# Patient Record
Sex: MALE | Race: WHITE | NOT HISPANIC OR LATINO
[De-identification: names, ages, dates, MRNs, and addresses within clinical notes are randomized per-mention and may not be internally consistent; named-entity substitution may affect disease eponyms.]

---

## 2017-06-23 PROBLEM — Z00.00 ENCOUNTER FOR PREVENTIVE HEALTH EXAMINATION: Status: ACTIVE | Noted: 2017-06-23

## 2017-06-27 ENCOUNTER — APPOINTMENT (OUTPATIENT)
Dept: DERMATOLOGY | Facility: CLINIC | Age: 82
End: 2017-06-27

## 2017-07-11 ENCOUNTER — APPOINTMENT (OUTPATIENT)
Dept: SURGICAL ONCOLOGY | Facility: CLINIC | Age: 82
End: 2017-07-11

## 2017-07-11 ENCOUNTER — RESULT REVIEW (OUTPATIENT)
Age: 82
End: 2017-07-11

## 2017-07-11 ENCOUNTER — LABORATORY RESULT (OUTPATIENT)
Age: 82
End: 2017-07-11

## 2017-07-11 DIAGNOSIS — Z86.79 PERSONAL HISTORY OF OTHER DISEASES OF THE CIRCULATORY SYSTEM: ICD-10-CM

## 2017-07-11 DIAGNOSIS — Z80.3 FAMILY HISTORY OF MALIGNANT NEOPLASM OF BREAST: ICD-10-CM

## 2017-07-11 DIAGNOSIS — Z78.9 OTHER SPECIFIED HEALTH STATUS: ICD-10-CM

## 2017-07-11 DIAGNOSIS — Z80.0 FAMILY HISTORY OF MALIGNANT NEOPLASM OF DIGESTIVE ORGANS: ICD-10-CM

## 2017-07-11 DIAGNOSIS — F17.210 NICOTINE DEPENDENCE, CIGARETTES, UNCOMPLICATED: ICD-10-CM

## 2017-07-11 DIAGNOSIS — N40.0 BENIGN PROSTATIC HYPERPLASIA WITHOUT LOWER URINARY TRACT SYMPMS: ICD-10-CM

## 2017-07-11 RX ORDER — AMLODIPINE BESYLATE AND VALSARTAN 10; 320 MG/1; MG/1
TABLET, FILM COATED ORAL
Refills: 0 | Status: ACTIVE | COMMUNITY

## 2017-07-11 RX ORDER — ATORVASTATIN CALCIUM 80 MG/1
TABLET, FILM COATED ORAL
Refills: 0 | Status: ACTIVE | COMMUNITY

## 2017-07-11 RX ORDER — HYDROCHLOROTHIAZIDE 12.5 MG/1
TABLET ORAL
Refills: 0 | Status: ACTIVE | COMMUNITY

## 2017-07-11 RX ORDER — RIVAROXABAN 2.5 MG/1
TABLET, FILM COATED ORAL
Refills: 0 | Status: ACTIVE | COMMUNITY

## 2017-07-11 RX ORDER — METOPROLOL TARTRATE 75 MG/1
TABLET, FILM COATED ORAL
Refills: 0 | Status: ACTIVE | COMMUNITY

## 2017-07-11 RX ORDER — MULTIVITAMIN
TABLET ORAL
Refills: 0 | Status: ACTIVE | COMMUNITY

## 2017-07-11 RX ORDER — DIGOXIN 0.06 MG/1
TABLET ORAL
Refills: 0 | Status: ACTIVE | COMMUNITY

## 2017-07-11 RX ORDER — FLUTICASONE FUROATE AND VILANTEROL TRIFENATATE 200; 25 UG/1; UG/1
200-25 POWDER RESPIRATORY (INHALATION)
Refills: 0 | Status: ACTIVE | COMMUNITY

## 2017-07-13 ENCOUNTER — APPOINTMENT (OUTPATIENT)
Dept: SURGICAL ONCOLOGY | Facility: CLINIC | Age: 82
End: 2017-07-13

## 2017-07-20 ENCOUNTER — APPOINTMENT (OUTPATIENT)
Dept: SURGICAL ONCOLOGY | Facility: CLINIC | Age: 82
End: 2017-07-20

## 2017-07-20 VITALS
HEIGHT: 72 IN | DIASTOLIC BLOOD PRESSURE: 83 MMHG | RESPIRATION RATE: 12 BRPM | SYSTOLIC BLOOD PRESSURE: 119 MMHG | HEART RATE: 79 BPM | OXYGEN SATURATION: 98 % | BODY MASS INDEX: 29.8 KG/M2 | WEIGHT: 220 LBS | TEMPERATURE: 98.3 F

## 2017-07-20 DIAGNOSIS — C44.42 SQUAMOUS CELL CARCINOMA OF SKIN OF SCALP AND NECK: ICD-10-CM

## 2017-07-21 PROBLEM — C44.42 SQUAMOUS CELL CARCINOMA OF NECK: Status: ACTIVE | Noted: 2017-07-11

## 2017-07-21 RX ORDER — OFLOXACIN 3 MG/ML
0.3 SOLUTION/ DROPS OPHTHALMIC
Qty: 5 | Refills: 0 | Status: ACTIVE | COMMUNITY
Start: 2017-06-23

## 2017-07-21 RX ORDER — AMOXICILLIN AND CLAVULANATE POTASSIUM 875; 125 MG/1; MG/1
875-125 TABLET, COATED ORAL
Qty: 14 | Refills: 0 | Status: ACTIVE | COMMUNITY
Start: 2017-07-16

## 2017-07-21 RX ORDER — ATORVASTATIN CALCIUM 20 MG/1
20 TABLET, FILM COATED ORAL
Qty: 90 | Refills: 0 | Status: ACTIVE | COMMUNITY
Start: 2017-05-27

## 2017-07-21 RX ORDER — METOPROLOL SUCCINATE 50 MG/1
50 TABLET, EXTENDED RELEASE ORAL
Qty: 180 | Refills: 0 | Status: ACTIVE | COMMUNITY
Start: 2017-07-07

## 2017-07-21 RX ORDER — AZITHROMYCIN 250 MG/1
250 TABLET, FILM COATED ORAL
Qty: 6 | Refills: 0 | Status: ACTIVE | COMMUNITY
Start: 2017-06-23

## 2017-07-21 RX ORDER — AMLODIPINE AND VALSARTAN 5; 320 MG/1; MG/1
5-320 TABLET, FILM COATED ORAL
Qty: 90 | Refills: 0 | Status: ACTIVE | COMMUNITY
Start: 2017-07-07

## 2017-07-21 RX ORDER — FLUTICASONE FUROATE AND VILANTEROL TRIFENATATE 100; 25 UG/1; UG/1
100-25 POWDER RESPIRATORY (INHALATION)
Qty: 60 | Refills: 0 | Status: ACTIVE | COMMUNITY
Start: 2017-02-22

## 2017-07-21 RX ORDER — TAMSULOSIN HYDROCHLORIDE 0.4 MG/1
0.4 CAPSULE ORAL
Qty: 180 | Refills: 0 | Status: ACTIVE | COMMUNITY
Start: 2017-06-20

## 2017-07-27 ENCOUNTER — APPOINTMENT (OUTPATIENT)
Dept: SURGICAL ONCOLOGY | Facility: CLINIC | Age: 82
End: 2017-07-27
Payer: MEDICARE

## 2017-07-27 VITALS
BODY MASS INDEX: 29.8 KG/M2 | SYSTOLIC BLOOD PRESSURE: 113 MMHG | RESPIRATION RATE: 13 BRPM | TEMPERATURE: 97.7 F | OXYGEN SATURATION: 98 % | HEART RATE: 77 BPM | DIASTOLIC BLOOD PRESSURE: 77 MMHG | WEIGHT: 220 LBS | HEIGHT: 72 IN

## 2017-07-27 DIAGNOSIS — L08.9 OTHER INJURY OF UNSPECIFIED BODY REGION: ICD-10-CM

## 2017-07-27 DIAGNOSIS — T14.8 OTHER INJURY OF UNSPECIFIED BODY REGION: ICD-10-CM

## 2017-07-27 PROCEDURE — 99024 POSTOP FOLLOW-UP VISIT: CPT

## 2017-08-28 ENCOUNTER — APPOINTMENT (OUTPATIENT)
Dept: DERMATOLOGY | Facility: CLINIC | Age: 82
End: 2017-08-28
Payer: MEDICARE

## 2017-08-28 PROCEDURE — ZZZZZ: CPT

## 2018-01-04 ENCOUNTER — INPATIENT (INPATIENT)
Facility: HOSPITAL | Age: 83
LOS: 6 days | Discharge: NURSING, REHAB OR LONG-TERM CARE | DRG: 698 | End: 2018-01-11
Attending: HOSPITALIST | Admitting: INTERNAL MEDICINE
Payer: MEDICARE

## 2018-01-04 VITALS
HEART RATE: 84 BPM | HEIGHT: 72 IN | WEIGHT: 205.91 LBS | RESPIRATION RATE: 18 BRPM | OXYGEN SATURATION: 96 % | SYSTOLIC BLOOD PRESSURE: 147 MMHG | DIASTOLIC BLOOD PRESSURE: 91 MMHG | TEMPERATURE: 98 F

## 2018-01-04 DIAGNOSIS — R33.8 OTHER RETENTION OF URINE: ICD-10-CM

## 2018-01-04 LAB
ALBUMIN SERPL ELPH-MCNC: 2.5 G/DL — LOW (ref 3.3–5.2)
ALBUMIN SERPL ELPH-MCNC: 3.2 G/DL — LOW (ref 3.3–5.2)
ALBUMIN SERPL ELPH-MCNC: 3.6 G/DL — SIGNIFICANT CHANGE UP (ref 3.3–5.2)
ALP SERPL-CCNC: 62 U/L — SIGNIFICANT CHANGE UP (ref 40–120)
ALP SERPL-CCNC: 78 U/L — SIGNIFICANT CHANGE UP (ref 40–120)
ALP SERPL-CCNC: 93 U/L — SIGNIFICANT CHANGE UP (ref 40–120)
ALT FLD-CCNC: 12 U/L — SIGNIFICANT CHANGE UP
ALT FLD-CCNC: 16 U/L — SIGNIFICANT CHANGE UP
ALT FLD-CCNC: 9 U/L — SIGNIFICANT CHANGE UP
ANION GAP SERPL CALC-SCNC: 14 MMOL/L — SIGNIFICANT CHANGE UP (ref 5–17)
ANION GAP SERPL CALC-SCNC: 17 MMOL/L — SIGNIFICANT CHANGE UP (ref 5–17)
ANION GAP SERPL CALC-SCNC: 21 MMOL/L — HIGH (ref 5–17)
AST SERPL-CCNC: 32 U/L — SIGNIFICANT CHANGE UP
AST SERPL-CCNC: 37 U/L — SIGNIFICANT CHANGE UP
AST SERPL-CCNC: 52 U/L — HIGH
BASE EXCESS BLDA CALC-SCNC: -7.3 MMOL/L — LOW (ref -2–2)
BASOPHILS # BLD AUTO: 0 K/UL — SIGNIFICANT CHANGE UP (ref 0–0.2)
BASOPHILS NFR BLD AUTO: 0.1 % — SIGNIFICANT CHANGE UP (ref 0–2)
BILIRUB SERPL-MCNC: 0.7 MG/DL — SIGNIFICANT CHANGE UP (ref 0.4–2)
BILIRUB SERPL-MCNC: 0.9 MG/DL — SIGNIFICANT CHANGE UP (ref 0.4–2)
BILIRUB SERPL-MCNC: 1.1 MG/DL — SIGNIFICANT CHANGE UP (ref 0.4–2)
BLOOD GAS COMMENTS ARTERIAL: SIGNIFICANT CHANGE UP
BUN SERPL-MCNC: 35 MG/DL — HIGH (ref 8–20)
BUN SERPL-MCNC: 37 MG/DL — HIGH (ref 8–20)
BUN SERPL-MCNC: 37 MG/DL — HIGH (ref 8–20)
CALCIUM SERPL-MCNC: 7.3 MG/DL — LOW (ref 8.6–10.2)
CALCIUM SERPL-MCNC: 8.6 MG/DL — SIGNIFICANT CHANGE UP (ref 8.6–10.2)
CALCIUM SERPL-MCNC: 9.8 MG/DL — SIGNIFICANT CHANGE UP (ref 8.6–10.2)
CHLORIDE SERPL-SCNC: 108 MMOL/L — HIGH (ref 98–107)
CHLORIDE SERPL-SCNC: 95 MMOL/L — LOW (ref 98–107)
CHLORIDE SERPL-SCNC: 99 MMOL/L — SIGNIFICANT CHANGE UP (ref 98–107)
CO2 SERPL-SCNC: 14 MMOL/L — LOW (ref 22–29)
CO2 SERPL-SCNC: 17 MMOL/L — LOW (ref 22–29)
CO2 SERPL-SCNC: 23 MMOL/L — SIGNIFICANT CHANGE UP (ref 22–29)
CREAT SERPL-MCNC: 0.94 MG/DL — SIGNIFICANT CHANGE UP (ref 0.5–1.3)
CREAT SERPL-MCNC: 1.67 MG/DL — HIGH (ref 0.5–1.3)
CREAT SERPL-MCNC: 1.8 MG/DL — HIGH (ref 0.5–1.3)
EOSINOPHIL # BLD AUTO: 0 K/UL — SIGNIFICANT CHANGE UP (ref 0–0.5)
EOSINOPHIL NFR BLD AUTO: 0.1 % — SIGNIFICANT CHANGE UP (ref 0–6)
GAS PNL BLDA: SIGNIFICANT CHANGE UP
GLUCOSE BLDC GLUCOMTR-MCNC: 100 MG/DL — HIGH (ref 70–99)
GLUCOSE SERPL-MCNC: 109 MG/DL — SIGNIFICANT CHANGE UP (ref 70–115)
GLUCOSE SERPL-MCNC: 109 MG/DL — SIGNIFICANT CHANGE UP (ref 70–115)
GLUCOSE SERPL-MCNC: 132 MG/DL — HIGH (ref 70–115)
HCO3 BLDA-SCNC: 19 MMOL/L — LOW (ref 20–26)
HCT VFR BLD CALC: 39.9 % — LOW (ref 42–52)
HCT VFR BLD CALC: 40.2 % — LOW (ref 42–52)
HCT VFR BLD CALC: 45.7 % — SIGNIFICANT CHANGE UP (ref 42–52)
HGB BLD-MCNC: 13.1 G/DL — LOW (ref 14–18)
HGB BLD-MCNC: 13.4 G/DL — LOW (ref 14–18)
HGB BLD-MCNC: 15.7 G/DL — SIGNIFICANT CHANGE UP (ref 14–18)
HOROWITZ INDEX BLDA+IHG-RTO: SIGNIFICANT CHANGE UP
LACTATE BLDV-MCNC: 3.3 MMOL/L — HIGH (ref 0.5–2)
LACTATE BLDV-MCNC: 4.7 MMOL/L — CRITICAL HIGH (ref 0.5–2)
LACTATE BLDV-MCNC: 5.7 MMOL/L — CRITICAL HIGH (ref 0.5–2)
LYMPHOCYTES # BLD AUTO: 0.2 K/UL — LOW (ref 1–4.8)
LYMPHOCYTES # BLD AUTO: 1.1 % — LOW (ref 20–55)
MAGNESIUM SERPL-MCNC: 1.6 MG/DL — SIGNIFICANT CHANGE UP (ref 1.6–2.6)
MCHC RBC-ENTMCNC: 30.8 PG — SIGNIFICANT CHANGE UP (ref 27–31)
MCHC RBC-ENTMCNC: 30.9 PG — SIGNIFICANT CHANGE UP (ref 27–31)
MCHC RBC-ENTMCNC: 31.4 PG — HIGH (ref 27–31)
MCHC RBC-ENTMCNC: 32.8 G/DL — SIGNIFICANT CHANGE UP (ref 32–36)
MCHC RBC-ENTMCNC: 33.3 G/DL — SIGNIFICANT CHANGE UP (ref 32–36)
MCHC RBC-ENTMCNC: 34.4 G/DL — SIGNIFICANT CHANGE UP (ref 32–36)
MCV RBC AUTO: 91.4 FL — SIGNIFICANT CHANGE UP (ref 80–94)
MCV RBC AUTO: 92.4 FL — SIGNIFICANT CHANGE UP (ref 80–94)
MCV RBC AUTO: 94.1 FL — HIGH (ref 80–94)
MONOCYTES # BLD AUTO: 0.1 K/UL — SIGNIFICANT CHANGE UP (ref 0–0.8)
MONOCYTES NFR BLD AUTO: 0.6 % — LOW (ref 3–10)
NEUTROPHILS # BLD AUTO: 13.7 K/UL — HIGH (ref 1.8–8)
NEUTROPHILS NFR BLD AUTO: 97.6 % — HIGH (ref 37–73)
PCO2 BLDA: 26 MMHG — LOW (ref 35–45)
PH BLDA: 7.4 — SIGNIFICANT CHANGE UP (ref 7.35–7.45)
PHOSPHATE SERPL-MCNC: 3 MG/DL — SIGNIFICANT CHANGE UP (ref 2.4–4.7)
PLATELET # BLD AUTO: 169 K/UL — SIGNIFICANT CHANGE UP (ref 150–400)
PLATELET # BLD AUTO: 213 K/UL — SIGNIFICANT CHANGE UP (ref 150–400)
PLATELET # BLD AUTO: 242 K/UL — SIGNIFICANT CHANGE UP (ref 150–400)
PO2 BLDA: 159 MMHG — HIGH (ref 83–108)
POTASSIUM SERPL-MCNC: 3.4 MMOL/L — LOW (ref 3.5–5.3)
POTASSIUM SERPL-MCNC: 4.9 MMOL/L — SIGNIFICANT CHANGE UP (ref 3.5–5.3)
POTASSIUM SERPL-MCNC: 5.2 MMOL/L — SIGNIFICANT CHANGE UP (ref 3.5–5.3)
POTASSIUM SERPL-SCNC: 3.4 MMOL/L — LOW (ref 3.5–5.3)
POTASSIUM SERPL-SCNC: 4.9 MMOL/L — SIGNIFICANT CHANGE UP (ref 3.5–5.3)
POTASSIUM SERPL-SCNC: 5.2 MMOL/L — SIGNIFICANT CHANGE UP (ref 3.5–5.3)
PROCALCITONIN SERPL-MCNC: 8.49 NG/ML — HIGH (ref 0–0.04)
PROT SERPL-MCNC: 5.1 G/DL — LOW (ref 6.6–8.7)
PROT SERPL-MCNC: 6.4 G/DL — LOW (ref 6.6–8.7)
PROT SERPL-MCNC: 7.1 G/DL — SIGNIFICANT CHANGE UP (ref 6.6–8.7)
RBC # BLD: 4.24 M/UL — LOW (ref 4.6–6.2)
RBC # BLD: 4.35 M/UL — LOW (ref 4.6–6.2)
RBC # BLD: 5 M/UL — SIGNIFICANT CHANGE UP (ref 4.6–6.2)
RBC # FLD: 13.4 % — SIGNIFICANT CHANGE UP (ref 11–15.6)
RBC # FLD: 13.6 % — SIGNIFICANT CHANGE UP (ref 11–15.6)
RBC # FLD: 13.6 % — SIGNIFICANT CHANGE UP (ref 11–15.6)
SAO2 % BLDA: 99 % — SIGNIFICANT CHANGE UP (ref 95–99)
SODIUM SERPL-SCNC: 132 MMOL/L — LOW (ref 135–145)
SODIUM SERPL-SCNC: 137 MMOL/L — SIGNIFICANT CHANGE UP (ref 135–145)
SODIUM SERPL-SCNC: 139 MMOL/L — SIGNIFICANT CHANGE UP (ref 135–145)
WBC # BLD: 13.8 K/UL — HIGH (ref 4.8–10.8)
WBC # BLD: 14 K/UL — HIGH (ref 4.8–10.8)
WBC # BLD: 24.8 K/UL — HIGH (ref 4.8–10.8)
WBC # FLD AUTO: 13.8 K/UL — HIGH (ref 4.8–10.8)
WBC # FLD AUTO: 14 K/UL — HIGH (ref 4.8–10.8)
WBC # FLD AUTO: 24.8 K/UL — HIGH (ref 4.8–10.8)

## 2018-01-04 PROCEDURE — 74018 RADEX ABDOMEN 1 VIEW: CPT | Mod: 26,59

## 2018-01-04 PROCEDURE — 99291 CRITICAL CARE FIRST HOUR: CPT

## 2018-01-04 PROCEDURE — 93010 ELECTROCARDIOGRAM REPORT: CPT

## 2018-01-04 PROCEDURE — 71045 X-RAY EXAM CHEST 1 VIEW: CPT | Mod: 26,76

## 2018-01-04 RX ORDER — CIPROFLOXACIN LACTATE 400MG/40ML
1 VIAL (ML) INTRAVENOUS
Qty: 10 | Refills: 0 | OUTPATIENT
Start: 2018-01-04 | End: 2018-01-08

## 2018-01-04 RX ORDER — PROPOFOL 10 MG/ML
10 INJECTION, EMULSION INTRAVENOUS
Qty: 1000 | Refills: 0 | Status: DISCONTINUED | OUTPATIENT
Start: 2018-01-04 | End: 2018-01-05

## 2018-01-04 RX ORDER — PIPERACILLIN AND TAZOBACTAM 4; .5 G/20ML; G/20ML
3.38 INJECTION, POWDER, LYOPHILIZED, FOR SOLUTION INTRAVENOUS EVERY 8 HOURS
Qty: 0 | Refills: 0 | Status: DISCONTINUED | OUTPATIENT
Start: 2018-01-04 | End: 2018-01-05

## 2018-01-04 RX ORDER — PANTOPRAZOLE SODIUM 20 MG/1
40 TABLET, DELAYED RELEASE ORAL DAILY
Qty: 0 | Refills: 0 | Status: DISCONTINUED | OUTPATIENT
Start: 2018-01-04 | End: 2018-01-05

## 2018-01-04 RX ORDER — ACETAMINOPHEN 500 MG
650 TABLET ORAL EVERY 6 HOURS
Qty: 0 | Refills: 0 | Status: DISCONTINUED | OUTPATIENT
Start: 2018-01-04 | End: 2018-01-05

## 2018-01-04 RX ORDER — CIPROFLOXACIN LACTATE 400MG/40ML
500 VIAL (ML) INTRAVENOUS ONCE
Qty: 0 | Refills: 0 | Status: COMPLETED | OUTPATIENT
Start: 2018-01-04 | End: 2018-01-04

## 2018-01-04 RX ORDER — SODIUM CHLORIDE 9 MG/ML
1000 INJECTION INTRAMUSCULAR; INTRAVENOUS; SUBCUTANEOUS ONCE
Qty: 0 | Refills: 0 | Status: COMPLETED | OUTPATIENT
Start: 2018-01-04 | End: 2018-01-04

## 2018-01-04 RX ORDER — CHLORHEXIDINE GLUCONATE 213 G/1000ML
15 SOLUTION TOPICAL
Qty: 0 | Refills: 0 | Status: DISCONTINUED | OUTPATIENT
Start: 2018-01-04 | End: 2018-01-05

## 2018-01-04 RX ORDER — SODIUM CHLORIDE 9 MG/ML
2000 INJECTION INTRAMUSCULAR; INTRAVENOUS; SUBCUTANEOUS ONCE
Qty: 0 | Refills: 0 | Status: COMPLETED | OUTPATIENT
Start: 2018-01-04 | End: 2018-01-04

## 2018-01-04 RX ORDER — VANCOMYCIN HCL 1 G
1000 VIAL (EA) INTRAVENOUS ONCE
Qty: 0 | Refills: 0 | Status: COMPLETED | OUTPATIENT
Start: 2018-01-04 | End: 2018-01-05

## 2018-01-04 RX ORDER — HEPARIN SODIUM 5000 [USP'U]/ML
5000 INJECTION INTRAVENOUS; SUBCUTANEOUS EVERY 8 HOURS
Qty: 0 | Refills: 0 | Status: DISCONTINUED | OUTPATIENT
Start: 2018-01-04 | End: 2018-01-11

## 2018-01-04 RX ORDER — OXYCODONE AND ACETAMINOPHEN 5; 325 MG/1; MG/1
1 TABLET ORAL EVERY 6 HOURS
Qty: 0 | Refills: 0 | Status: DISCONTINUED | OUTPATIENT
Start: 2018-01-04 | End: 2018-01-06

## 2018-01-04 RX ORDER — OXYCODONE AND ACETAMINOPHEN 5; 325 MG/1; MG/1
1 TABLET ORAL ONCE
Qty: 0 | Refills: 0 | Status: DISCONTINUED | OUTPATIENT
Start: 2018-01-04 | End: 2018-01-04

## 2018-01-04 RX ORDER — ACETAMINOPHEN 500 MG
975 TABLET ORAL ONCE
Qty: 0 | Refills: 0 | Status: COMPLETED | OUTPATIENT
Start: 2018-01-04 | End: 2018-01-04

## 2018-01-04 RX ORDER — NOREPINEPHRINE BITARTRATE/D5W 8 MG/250ML
0.05 PLASTIC BAG, INJECTION (ML) INTRAVENOUS
Qty: 8 | Refills: 0 | Status: DISCONTINUED | OUTPATIENT
Start: 2018-01-04 | End: 2018-01-06

## 2018-01-04 RX ORDER — METOPROLOL TARTRATE 50 MG
5 TABLET ORAL EVERY 6 HOURS
Qty: 0 | Refills: 0 | Status: DISCONTINUED | OUTPATIENT
Start: 2018-01-04 | End: 2018-01-04

## 2018-01-04 RX ORDER — SODIUM CHLORIDE 9 MG/ML
1000 INJECTION INTRAMUSCULAR; INTRAVENOUS; SUBCUTANEOUS
Qty: 0 | Refills: 0 | Status: DISCONTINUED | OUTPATIENT
Start: 2018-01-04 | End: 2018-01-05

## 2018-01-04 RX ORDER — SODIUM CHLORIDE 9 MG/ML
1000 INJECTION INTRAMUSCULAR; INTRAVENOUS; SUBCUTANEOUS
Qty: 0 | Refills: 0 | Status: DISCONTINUED | OUTPATIENT
Start: 2018-01-04 | End: 2018-01-04

## 2018-01-04 RX ORDER — CEFTRIAXONE 500 MG/1
1 INJECTION, POWDER, FOR SOLUTION INTRAMUSCULAR; INTRAVENOUS ONCE
Qty: 0 | Refills: 0 | Status: COMPLETED | OUTPATIENT
Start: 2018-01-04 | End: 2018-01-04

## 2018-01-04 RX ORDER — HALOPERIDOL DECANOATE 100 MG/ML
2 INJECTION INTRAMUSCULAR ONCE
Qty: 0 | Refills: 0 | Status: COMPLETED | OUTPATIENT
Start: 2018-01-04 | End: 2018-01-04

## 2018-01-04 RX ADMIN — FENTANYL CITRATE 25 MICROGRAM(S): 50 INJECTION INTRAVENOUS at 23:00

## 2018-01-04 RX ADMIN — SODIUM CHLORIDE 2000 MILLILITER(S): 9 INJECTION INTRAMUSCULAR; INTRAVENOUS; SUBCUTANEOUS at 18:52

## 2018-01-04 RX ADMIN — Medication 500 MILLIGRAM(S): at 15:01

## 2018-01-04 RX ADMIN — Medication 5 MILLIGRAM(S): at 19:30

## 2018-01-04 RX ADMIN — OXYCODONE AND ACETAMINOPHEN 1 TABLET(S): 5; 325 TABLET ORAL at 17:40

## 2018-01-04 RX ADMIN — Medication 650 MILLIGRAM(S): at 21:30

## 2018-01-04 RX ADMIN — Medication 975 MILLIGRAM(S): at 21:40

## 2018-01-04 RX ADMIN — FENTANYL CITRATE 25 MICROGRAM(S): 50 INJECTION INTRAVENOUS at 22:30

## 2018-01-04 RX ADMIN — OXYCODONE AND ACETAMINOPHEN 1 TABLET(S): 5; 325 TABLET ORAL at 15:02

## 2018-01-04 RX ADMIN — SODIUM CHLORIDE 1000 MILLILITER(S): 9 INJECTION INTRAMUSCULAR; INTRAVENOUS; SUBCUTANEOUS at 15:02

## 2018-01-04 RX ADMIN — CEFTRIAXONE 100 GRAM(S): 500 INJECTION, POWDER, FOR SOLUTION INTRAMUSCULAR; INTRAVENOUS at 16:52

## 2018-01-04 RX ADMIN — SODIUM CHLORIDE 150 MILLILITER(S): 9 INJECTION INTRAMUSCULAR; INTRAVENOUS; SUBCUTANEOUS at 22:30

## 2018-01-04 RX ADMIN — Medication 0.5 MILLIGRAM(S): at 18:08

## 2018-01-04 RX ADMIN — PROPOFOL 5.6 MICROGRAM(S)/KG/MIN: 10 INJECTION, EMULSION INTRAVENOUS at 22:29

## 2018-01-04 RX ADMIN — PIPERACILLIN AND TAZOBACTAM 25 GRAM(S): 4; .5 INJECTION, POWDER, LYOPHILIZED, FOR SOLUTION INTRAVENOUS at 18:51

## 2018-01-04 RX ADMIN — HALOPERIDOL DECANOATE 2 MILLIGRAM(S): 100 INJECTION INTRAMUSCULAR at 18:48

## 2018-01-04 RX ADMIN — Medication 8.76 MICROGRAM(S)/KG/MIN: at 22:27

## 2018-01-04 NOTE — ED PROVIDER NOTE - PROGRESS NOTE DETAILS
Pt got clots in malcolm and flushed and then 23 way put for continuous bledder irriagation and that stopped and flushed but still blocked after ports flushed will call pts urologist Dr Schumer and try to change cbi cath to a new one called Pt got clots in malcolm and flushed and then 23 way put for continuous bladder irrigation and that stopped and flushed but still blocked after ports flushed will call pts urologist Dr Schumer and try to change cbi cath to a new one called cbi cath changed and cath removed a lot of clot and new one large clots came out at first and then stopped draining as well Dr Schumer pts urologist called and rec use 22 gauge 3 way and if doesn't clear then admit with it in and if clears d/c with sheila Berry he does not come here so if he stays call whoever is on call pt on irrigation and clots much less case discussed with dr Ardon and she rec admit on cbi and give Rocephin daily while on cbi and she will see for urology consult tomorrow Hospiatlist Dr Hwang case discussed and she will direct further care

## 2018-01-04 NOTE — H&P ADULT - ASSESSMENT
Urinary Retention  recent hx of malcolm placement by urology in past, sp removal  presented with recurrent retention  sp malcolm placement in ER with ~1L initial void  also noted to be complicated by gross hematuria w/ clots  currently with malcolm in place with cbi  urology consulted, recommended empiric tx with rocephin for uti, started 1/4  fu UA/UC  pt to be seen in AM by inpt urology, his outpt physician does not have privileges here  will hold xarelto overnight, likely exacerbating hematuria, repeat CBC to ensure hgb remaining stable prior to resuming  may need cysto for eval at some point, will fu urology for final plan  no voiding trial overnight, await urology eval  cont cbi    AFib  on xarelto as outpt, will hold given gross hematuria, resume when improved + if hgb remains stable  goal HR <110, controlled without meds    HTN  goal BP <150/90, controlled  not on med therapy  dash diet    Leukocytosis  unknown source at present  may be related to urinary retention/hematuria at the moment  pending UA/UC to r/o UTI  still pending XR as well to eval for resp pathology  in interim empirically tx with rocephin per urology  repeat cbc in AM    Hyponatremia  mild, asymptomatic  cause unknown, suspect hypovolemia/dehydration given elevated BUN/Cr ratio  gently IVF overnight  repeat in AM    Hyperglycemia  w/o hx DM  not done on a fasting sample  repeat in AM, if still elevated will consider checking A1c    Elevated AST  without clear cause  not clinically significant at the moment  repeat CMP in AM, if stable can be worked up further as outpt 82yoM with PMH HTN, Afib on Xarelto, and unknown further hx presenting with urinary retention, noted to have sushila gross hematuria with clotting on placement of malcolm catheter. Presentation also complicated by AMS. Suspected at this time to have UTI with concerns for developing sepsis.     Urinary Retention  recent hx of malcolm placement by urology in past, sp removal  presented with recurrent retention  sp malcolm placement in ER with ~1L initial void, noted to be complicated by gross hematuria w/ clots  currently with malcolm in place with cbi  urology consulted Dr. Ardon, recommended empiric tx with rocephin for uti, started 1/4, will escalate to zosyn given what appears to be impending sepsis with rigors, AMS, developing tachycardia  fu UA/UC, suspecting UTI at this time until proven otherwise  NS@150 overnight  check blood cx x2, lactate  check stat US KUB to eval for presence of hydroureter, stones, etc.  pt to be seen in AM by inpt urology, his outpt physician does not have privileges here  will hold xarelto overnight, likely exacerbating hematuria, repeat CBC to ensure hgb remaining stable prior to resuming  may need cysto for eval at some point, will fu urology for final plan  no voiding trial overnight, await urology eval  cont cbi    Toxic Metabolic Encephalopathy  acutely worsening during ER stay  suspect secondary to uti, still pending UA to confirm infection  ativan 0.5mg prn agitation, may need 1:1 sit if he continues to decline  additionally, if continues to worsen will consider checking imaging of the brain, at this time very strongly doubt acute neurologic event, physical exam without deficits outside of mental status    AFib  on xarelto as outpt, will hold given gross hematuria, resume when improved + if hgb remains stable  goal HR <110, controlled without meds    HTN  goal BP <150/90, controlled  not on med therapy  dash diet    Leukocytosis  unknown source at present  may be related to urinary retention/hematuria at the moment, strongly suspecting UTI, pending UA/UC to r/o UTI  still pending XR as well to eval for resp pathology although exam benign  in interim empirically tx, sp rocephin x1 dose, po cipro x1 dose  will start zosyn instead  repeat cbc in AM    Hyponatremia  mild, asymptomatic  cause unknown, suspect hypovolemia/dehydration given elevated BUN/Cr ratio  IVF overnight  repeat in AM    Hyperglycemia  w/o hx DM  not done on a fasting sample  repeat in AM, if still elevated will consider checking A1c    Elevated AST  without clear cause  not clinically significant at the moment  repeat CMP in AM, if stable can be worked up further as outpt

## 2018-01-04 NOTE — ED ADULT NURSE NOTE - OBJECTIVE STATEMENT
pt states " about 2 weeks ago I had a malcolm placed for urinary retention at Dr. Kraus's office, yesterday he removed cath, was able to urinate a little bit, call MD he told me to go to ER for cath placement.

## 2018-01-04 NOTE — ED ADULT NURSE REASSESSMENT NOTE - NS ED NURSE REASSESS COMMENT FT1
New ordered received to change 3 way cath to 22g, pt educated, cath changed, pt tolerated, CBI up running with minimal small clots, pink fluid draining without difficulty.

## 2018-01-04 NOTE — ED PROVIDER NOTE - OBJECTIVE STATEMENT
81 y/o male with a h/o afib and HTn states he had urinary retention and had a malcolm placed and left for 2 weeks by his urologist Dr Mark Shumer and he took it out yesterday and he was only able to void a small amount and then he has not been able to void much since and he has slowly developed lower abdominal distention 81 y/o male with a h/o afib and HTn states he had urinary retention and had a malcolm placed and left for 2 weeks by his urologist Dr Mark Schumer and he took it out yesterday and he was only able to void a small amount and then he has not been able to void much since and he has slowly developed lower abdominal distention

## 2018-01-04 NOTE — ED ADULT NURSE REASSESSMENT NOTE - NS ED NURSE REASSESS COMMENT FT1
pt O2sat 93-94 %, resp 28, hr 124, pt placed on 2lnc, Dr Olivares made aware, per MD no o2 needed at this time.  pt malcolm not draining, CBI is not running, blood in returning into CBI, CBI clamped,  Dr. Olivares made aware verbal order to flush malcolm, with small clot coming out around cath, blood return to CBI,  Dr. Olivares made aware, RN suggested to change 3 way cath, MD stated to continue CBI not to change cath, RN asked MD to come to pt bedside, RN showed md that blood was returning to CBI, question if CBI should remain clamp. MD stated to leave CBI open.

## 2018-01-04 NOTE — ED ADULT NURSE REASSESSMENT NOTE - NS ED NURSE REASSESS COMMENT FT1
upon entering pt room, pt noted pt was not acting like himself, pt was getting oob, RN asked pt if he need something, pt stated I want to get dressed so I can go wrap presents.  RN asked pt where he was, pt stated [place], then said no [place].  RN asked pt if he remembered RN, pt stated " yes your Rosa."    RN noted pt was shivering, RN noted pt malcolm not draining an a few small clots in tubing, RN tried to flush cath with several clots noted. RN went to ask thorpe clerk for MD phone number upon returning, noted MD at bedside.  Dr Segovia at bedside. updated MD, new orders received, order to change 3way malcolm cath and restart CBI. RN called Charge Nursed asked for monitor bed.

## 2018-01-04 NOTE — BRIEF OPERATIVE NOTE - OPERATION/FINDINGS
prostatic urethral injury, inflamed bladder mucosa.  no obvious ongoing bleeding. no obvious bladder tumor.  retention of 1500+ urine and irrigant.

## 2018-01-04 NOTE — BRIEF OPERATIVE NOTE - PROCEDURE
<<-----Click on this checkbox to enter Procedure Cytoscopic insertion of urinary catheter  01/04/2018    Active  [id number]  Cystoscopy with evacuation of clots  01/04/2018    Active  [id number]

## 2018-01-04 NOTE — H&P ADULT - HISTORY OF PRESENT ILLNESS
82yoM from home with PMH HTN, AFib on Xarelto presenting with urinary retention x1d. 82yoM from home with PMH HTN, AFib on Xarelto, and additional unknown hx presenting with urinary retention x2wks. Pt is a poor historian at this time, is not oriented x3, cannot fully trust his story. As per pt, has been experiencing 2wk hx of urinary retention during the day complicated by 4-5mo hx of nocturia, 6-7x each evening. He has been regularly following Urology Dr. Mark Schumer for the past 5-6 mo and was worked up for retention. Berry catheter was placed at that time, by his report his prostate was reportedly normal during the prior examinations in the office. Since removing the catheter 2wks ago symptoms have occurred as stated previously. Also during that time period he admits to onset of dysuria, difficulty initiating and maintaining stream as well. Also reports 1x episode of rigors in the home during the past week lasting 5min and resolving on its own.

## 2018-01-04 NOTE — ED ADULT NURSE REASSESSMENT NOTE - NS ED NURSE REASSESS COMMENT FT1
pt trying to pull out malcolm and IV sited, 2 RN at bedside, call placed to MD order received for ativan.

## 2018-01-04 NOTE — H&P ADULT - NSHPLABSRESULTS_GEN_ALL_CORE
01-04    132<L>  |  95<L>  |  35.0<H>  ----------------------------<  132<H>  5.2   |  23.0  |  0.94    Ca    9.8      04 Jan 2018 13:51    TPro  7.1  /  Alb  3.6  /  TBili  1.1  /  DBili  x   /  AST  52<H>  /  ALT  16  /  AlkPhos  78  01-04                          15.7   13.8  )-----------( 242      ( 04 Jan 2018 13:51 )             45.7     LIVER FUNCTIONS - ( 04 Jan 2018 13:51 )  Alb: 3.6 g/dL / Pro: 7.1 g/dL / ALK PHOS: 78 U/L / ALT: 16 U/L / AST: 52 U/L / GGT: x

## 2018-01-04 NOTE — CHART NOTE - NSCHARTNOTEFT_GEN_A_CORE
Code Sepsis called for Temp, HR, RR, BP    HPI:  82yoM from home with PMH HTN, AFib on Xarelto, and additional unknown hx presenting with urinary retention x2wks. Pt is a poor historian at this time, is not oriented x3, cannot fully trust his story. Code sepsis was called because patient had a rectal temperature of 101.2 and tachypenic (30) with suspected source of infection. Patient is poor historian, unable to obtain any other history.       Vitals:  HR: 110-160  (rapid afib)   RR: 30   Spo2: 92   Blood glucose: 100        MEDICATIONS  (STANDING):  piperacillin/tazobactam IVPB. 3.375 Gram(s) IV Intermittent every 8 hours  sodium chloride 0.9%. 1000 milliLiter(s) (150 mL/Hr) IV Continuous <Continuous>    MEDICATIONS  (PRN):  LORazepam   Injectable 1 milliGRAM(s) IntraMuscular every 4 hours PRN Agitation  metoprolol    tartrate Injectable 5 milliGRAM(s) IV Push every 6 hours PRN For HR is >130  oxyCODONE    5 mG/acetaminophen 325 mG 1 Tablet(s) Oral every 6 hours PRN Severe Pain (7 - 10)      Vital Signs Last 24 Hrs  T(C): 37.1 (01-04-18 @ 15:30), Max: 37.1 (01-04-18 @ 15:30)  T(F): 98.7 (01-04-18 @ 15:30), Max: 98.7 (01-04-18 @ 15:30)  HR: 85 (01-04-18 @ 15:30) (84 - 85)  BP: 134/82 (01-04-18 @ 15:30) (134/82 - 147/91)  BP(mean): --  RR: 18 (01-04-18 @ 15:30) (18 - 18)  SpO2: 97% (01-04-18 @ 15:30) (96% - 97%)  Daily Height in cm: 182.88 (04 Jan 2018 11:44)    Daily   I&O's Summary      PHYSICAL EXAM:    GENERAL:      NECK: Supple, No JVD  NERVOUS SYSTEM:  Alert & Oriented X 3,  Grossly moving all extremities   CHEST/LUNG: No rales, rhonchi, wheezing, or rubs  HEART: Regular rate and rhythm; No murmurs, rubs, or gallops  ABDOMEN: Soft, Nontender, Nondistended; Bowel sounds present  EXTREMITIES:  2+ Peripheral Pulses, No clubbing, cyanosis, or edema  SKIN: warm, turgor good,  capillary refill    <2 sec    or    >2 sec      Assessment-  1. Sepsis- a new suspected infection + 2 of ( Temp >101/ HR 90 or more/ RR >20/ WBC >12 K or < 4K/ Bands > 5% )  2. Severe sepsis/ Septic shock- Sepsis + (AMS/ SBP< 90/ MAP <65/ SBP reduced > 40 mmHg from baseline/ Plts <100K/ Cr > 2/ Cr > 50% from baseline/>50% FiO2 required for SaO2 >90%/ U Output < 30 ml/hr or < 240 ml in 8 hrs/ Bilirubin >2/ Lactate >2/ INR > 1.5/   PTT> 60 secs )    -----------------------------------------------------------------------------------------------------------------------------------------------------------------------------------  Assessment-  1. Sepsis                                                                                                                                             Time                      Intervention-  STAT Labs- CBC, CMP, S Lactate, Blood C/S x 2, PT/ PTT/ INR, UA, Urine C/S, CXR                          Time drawn  Antibiotic -                                                                                                                                           Time started  (Monotherapy- Aminoglycosides/ Cipro/ Aztreonam/ Cephalosporin/ Clinda/ Dapto/ Vanco/ Linezolid/ Macrolide/ PCN)    OTHER antibiotic due to-     Intervention - Fluids                                                                                                                          Time started  Crystalloid fluid( NS; Ringer's; Plasmalyte)  Rate (30ml/Kg in 500ml boluses Q 15 mins until goal)  Total Volume-    NOT given fluids due to-     If Lactate >2 repeat after fluid bolus  -----------------------------------------------------------------------------------------------------------------------------------------------------------------------------------  Assessment-  2. Severe Sepsis (Decreased SBP>40; Lactate >2; organ dysfxn)                                              Time    Intervention                                                                                                                                       Time started  1) Crystalloid fluid( NS; Ringer's; Plasmalyte)  Rate (30ml/Kg in 500ml boluses Q 15 mins until goal)  Total Volume-    Not given fluids due to-     2) Antibiotic                                                                                                                                            Time started  ( Combination therapy- Aminoglycosides/ Cipro/ Aztreonam) + (Cephalosporin/ Clinda/ Dapto/ Vanco/ Linezolid/ Macrolide/ PC)    Repeat Lactate                                                                                                                                  Time drawn    measure BP Q 30 mins after each bolus  -----------------------------------------------------------------------------------------------------------------------------------------------------------------------------------  Assessment-  3. Septic shock (Lactate >4; SBP<90; organ dysfxn; persistent hypotension)                        Time    Intervention  Fluids                                                                                                                                                  Time started  Crystalloid fluid( NS; Ringer's; Plasmalyte)  Rate (30ml/Kg in 500ml boluses Q 15 mins until goal)  Total Volume-    NOT given fluids due to-     Antibiotic                                                                                                                                            Time started  ( Combination therapy- Aminoglycosides/ Cipro/ Aztreonam) + (Cephalosporin/ Clinda/ Dapto/ Vanco/ Linezolid/ Macrolide/ PC)    Vasopressor                                                                                                                                        Time started  Drug/ Rate    Additional Fluids                                                                                                                                 Time started  Crystalloid fluid( NS; Ringer's; Plasmalyte)  Rate (30ml/Kg in 500ml boluses Q 15 mins until goal)  Total Volume    Repeat Lactate                                                                                                                                   Time drawn  ----------------------------------------------------------------------------------------------------------------------------------------------------------------------------------- Code Sepsis called for Temp, HR, RR, BP    HPI:  82yoM from home with PMH HTN, AFib on Xarelto, and additional unknown hx presenting with urinary retention x2wks. Pt is a poor historian at this time, is not oriented x3, cannot fully trust his story. Code sepsis was called because patient had a rectal temperature of 101.2 and tachypenic (30) with suspected source of infection. Patient is poor historian, unable to obtain any other history.       Vitals:  HR: 110-160  (rapid afib)   RR: 30   Spo2: 92   Blood glucose: 100        MEDICATIONS  (STANDING):  piperacillin/tazobactam IVPB. 3.375 Gram(s) IV Intermittent every 8 hours  sodium chloride 0.9%. 1000 milliLiter(s) (150 mL/Hr) IV Continuous <Continuous>    MEDICATIONS  (PRN):  LORazepam   Injectable 1 milliGRAM(s) IntraMuscular every 4 hours PRN Agitation  metoprolol    tartrate Injectable 5 milliGRAM(s) IV Push every 6 hours PRN For HR is >130  oxyCODONE    5 mG/acetaminophen 325 mG 1 Tablet(s) Oral every 6 hours PRN Severe Pain (7 - 10)      Vital Signs Last 24 Hrs  T(C): 37.1 (01-04-18 @ 15:30), Max: 37.1 (01-04-18 @ 15:30)  T(F): 98.7 (01-04-18 @ 15:30), Max: 98.7 (01-04-18 @ 15:30)  HR: 85 (01-04-18 @ 15:30) (84 - 85)  BP: 134/82 (01-04-18 @ 15:30) (134/82 - 147/91)  BP(mean): --  RR: 18 (01-04-18 @ 15:30) (18 - 18)  SpO2: 97% (01-04-18 @ 15:30) (96% - 97%)  Daily Height in cm: 182.88 (04 Jan 2018 11:44)    Daily   I&O's Summary      PHYSICAL EXAM:    GENERAL:   AAOX0, grossly moving all 4 extremities   NECK: Supple, No JVD  NERVOUS SYSTEM:   Grossly moving all extremities   CHEST/LUNG: mild bibasilar crackles  HEART: irregularly irregular,  No murmurs, rubs, or gallops  ABDOMEN:  distended; Bowel sounds present, no fluid wave appreciable, soft, Charles negative  EXTREMITIES:  2+ Peripheral Pulses, No clubbing, cyanosis, grade 1 pitting edema   SKIN: warm, turgor good,  capillary refill    <2 sec           Assessment-  1. Sepsis-suspected urinary tract infection + temp 101.2, tachypnea    - source of sepsis is most likely urinary tract infection  - us kindey and bladder stat  - abdomen is distended will do x-ray of the abdomen stat to look for perforation   - fluids given at 30 cc/kg, zosyn started   - lactate/procalcitonin, repeat lactate within half an hour after completion of fluid bolus   - Code Sepsis called for Temp, HR, RR, BP    HPI:  82yoM from home with PMH HTN, AFib on Xarelto, and additional unknown hx presenting with urinary retention x2wks. Pt is a poor historian at this time, is not oriented x3, cannot fully trust his story. Code sepsis was called because patient had a rectal temperature of 101.2 and tachypenic (30) with suspected source of infection. Patient is poor historian, unable to obtain any other history.       Vitals:  HR: 110-160  (rapid afib)   RR: 30   Spo2: 92   Blood glucose: 100        MEDICATIONS  (STANDING):  piperacillin/tazobactam IVPB. 3.375 Gram(s) IV Intermittent every 8 hours  sodium chloride 0.9%. 1000 milliLiter(s) (150 mL/Hr) IV Continuous <Continuous>    MEDICATIONS  (PRN):  LORazepam   Injectable 1 milliGRAM(s) IntraMuscular every 4 hours PRN Agitation  metoprolol    tartrate Injectable 5 milliGRAM(s) IV Push every 6 hours PRN For HR is >130  oxyCODONE    5 mG/acetaminophen 325 mG 1 Tablet(s) Oral every 6 hours PRN Severe Pain (7 - 10)      Vital Signs Last 24 Hrs  T(C): 37.1 (01-04-18 @ 15:30), Max: 37.1 (01-04-18 @ 15:30)  T(F): 98.7 (01-04-18 @ 15:30), Max: 98.7 (01-04-18 @ 15:30)  HR: 85 (01-04-18 @ 15:30) (84 - 85)  BP: 134/82 (01-04-18 @ 15:30) (134/82 - 147/91)  BP(mean): --  RR: 18 (01-04-18 @ 15:30) (18 - 18)  SpO2: 97% (01-04-18 @ 15:30) (96% - 97%)  Daily Height in cm: 182.88 (04 Jan 2018 11:44)    Daily   I&O's Summary      PHYSICAL EXAM:    GENERAL:   AAOX0, grossly moving all 4 extremities   NECK: Supple, No JVD  NERVOUS SYSTEM:   Grossly moving all extremities   CHEST/LUNG: mild bibasilar crackles  HEART: irregularly irregular,  No murmurs, rubs, or gallops  ABDOMEN:  distended; Bowel sounds present, no fluid wave appreciable, soft, Charles negative  EXTREMITIES:  2+ Peripheral Pulses, No clubbing, cyanosis, grade 1 pitting edema   SKIN: warm, turgor good,  capillary refill    <2 sec           Assessment-  1. Sepsis-suspected urinary tract infection + temp 101.2, tachypnea    - source of sepsis is most likely urinary tract infection  - us kindey and bladder stat  - abdomen is distended will do x-ray of the abdomen stat to look for perforation   - fluids given at 30 cc/kg, zosyn started   - lactate/procalcitonin, repeat lactate within half an hour after completion of fluid bolus   - urology (Dr. Ardon consulted)  - concern for bladder perforation  - ICU and attending called

## 2018-01-04 NOTE — H&P ADULT - NSHPPHYSICALEXAM_GEN_ALL_CORE
T(C): 36.4 (01-04-18 @ 11:44), Max: 36.4 (01-04-18 @ 11:44)  HR: 84 (01-04-18 @ 11:44) (84 - 84)  BP: 147/91 (01-04-18 @ 11:44) (147/91 - 147/91)  RR: 18 (01-04-18 @ 11:44) (18 - 18)  SpO2: 96% (01-04-18 @ 11:44) (96% - 96%)    GEN - appears age appropriate. well nourished. pleasant. no distress.   HEENT - NCAT, EOMI, PABLO, no JVD/bruit.  RESP - CTA BL, no wheeze/stridor/rhonchi/crackles. not on supplemental O2.  CARDIO - NS1S2, RRR. No murmurs/rubs/gallops.  ABD - Soft/Non tender/Non distended. Normal BS x4 quadrants.   Ext - No FINA. no signs of venous/arterial stasis ulcers  MSK - full ROM of BL upper and lower extremities without pain or restriction. BL 5/5 strength on upper and lower extremities.   Neuro - cn 2-12 grossly intact. cerebellar function intact. no visible seizure activity appreciated. no tremor. gait not observed.   Skin - clean, dry, intact. no rashes or lesions.    Psych- AAOx3. no suicidal/homicidal ideation. appropriate behaviour. attentive. normal affect. T(C): 36.4 (01-04-18 @ 11:44), Max: 36.4 (01-04-18 @ 11:44)  HR: 84 (01-04-18 @ 11:44) (84 - 84)  BP: 147/91 (01-04-18 @ 11:44) (147/91 - 147/91)  RR: 18 (01-04-18 @ 11:44) (18 - 18)  SpO2: 96% (01-04-18 @ 11:44) (96% - 96%)    GEN - appears age appropriate. well nourished. mod distress. rigors present  HEENT - NCAT, EOMI, PABLO, no JVD/bruit.  RESP - CTA BL, no wheeze/stridor/rhonchi/crackles. not on supplemental O2.  CARDIO - NS1S2, RRR. No murmurs/rubs/gallops.  ABD - Soft/lower quadrant midline + LLQ tenderness/mildly distended. Normal BS x4 quadrants. no cva tenderness  Ext - No FINA. no signs of venous/arterial stasis ulcers   - malcolm in place, red urine in collection, no clots seen  Neuro - no visible seizure activity appreciated. no tremor. gait not observed.   Skin - clean, dry, intact. no rashes or lesions.    Psych- AAOx1 (only to person), becoming somewhat agitated and non cooperative. no suicidal/homicidal ideation. appropriate behaviour. attentive. normal affect.

## 2018-01-04 NOTE — BRIEF OPERATIVE NOTE - POST-OP DX
Gross hematuria  01/04/2018    Marley Lozada  Injury of urethra, initial encounter  01/04/2018    Marley Lozada  Prostatitis, unspecified prostatitis type  01/04/2018    Marley Lozada  Urinary retention due to benign prostatic hyperplasia  01/04/2018    Marley Lozada

## 2018-01-04 NOTE — ED ADULT NURSE REASSESSMENT NOTE - NS ED NURSE REASSESS COMMENT FT1
MD updated on clots noted malcolm not flowing, MD ordered CBI,  pt updated,  18g malcolm cath changed to 3 way, cath flushed with clots an pink urine, CBI connected, flowing with pink fluid and small clots noted in cath. md updated.

## 2018-01-04 NOTE — ED ADULT NURSE REASSESSMENT NOTE - NS ED NURSE REASSESS COMMENT FT1
pt , RN asked DR. Olivares to reassess pt at bedside,  lopressor and liter bolus given as ordered.  RN updated MD that CBI is not running, even with flushing cath, RN suggested to change Cath, MD states he is going to notify urology they can change cath, RN suggested to leave CBI clamped, that blood was returning to tube.  Dr. Olivares stated to leave CBI open. RN asked thorpe clerk to call  Dr. Ardon awaiting return call.

## 2018-01-04 NOTE — ED ADULT NURSE REASSESSMENT NOTE - NS ED NURSE REASSESS COMMENT FT1
Dr. Watson updated RN that pt urine was running clear that he turned off CBI, pt was being admitted.

## 2018-01-04 NOTE — ED ADULT TRIAGE NOTE - CHIEF COMPLAINT QUOTE
patient arrived via EMS, awake alert, and oriented times 3, breathing unlabored.  patient had malcolm catheter for 2 weeks, had removed yesterday and has urinal retention since last night.  urologist told patine to come to ED

## 2018-01-04 NOTE — PROVIDER CONTACT NOTE (EICU) - ACTION/TREATMENT ORDERED:
As above, reminded bedside team to consider gi ppx and oral care after patient returns from OR if still intubated.

## 2018-01-04 NOTE — ED ADULT NURSE REASSESSMENT NOTE - NS ED NURSE REASSESS COMMENT FT1
pt educated on need for malcolm Cath placement, pt agreed, upon placement of malcolm, noted blood coming out into cath tube an around opening of penis, blood did not flow to faoly bag, Dr. Grossman updated at bedside, irrigated malcolm, had clots and blood, about 1000cc of blood drained from malcolm MD aware. malcolm cath clotted, malcolm flushed, with no return MD aware, asked to change cath to 3 way for CBI

## 2018-01-04 NOTE — ED PROVIDER NOTE - CARE PLAN
Principal Discharge DX:	Acute urinary retention Principal Discharge DX:	Acute urinary retention  Secondary Diagnosis:	Hematuria

## 2018-01-04 NOTE — ED ADULT NURSE REASSESSMENT NOTE - NS ED NURSE REASSESS COMMENT FT1
Assumed pt care @ 1930, at bedside with Day shift JOSE De La Rosa for bedside report. Pt ST and irregular on cardiac monitor, belly breathing with resp in the 40s. Resident Dr. Olivares @ bedside with Dayshift RN and myself, and RN asked if we should give Tylenol for fever and MD said no. Pt has CBI going but it is not working. RN asked Dr. Schmitt if pt should be placed on NRB at this time and was told no. Repeat labs drawn. Escalated the situation to the attending, Dr. Garcia came to bedside @ 1950 and attempted to change Berry and flush and get out clots. Bedside Bladder scanner preformed greater than 550 in bladder. ER Doctor Dr. Macario @ bedside helping Dr. Garcia. Dr. Ardon from Urology on phone with Dr. Garcia talking her through what to do. Dr. Ardon not coming in until scans are back.  Made aware that pt is not stable for that at this time. As her Urology pt to not be reconnected to CBI and to just flush Berry catheter. MICU SOPHIE Aguillon @ bedside for assessment. Pt brought to the critical care room for stat intubation @2038. Dr. Diaz from  ACS @ bedside for pt, poss surgical stat candidate. Pt placed on Levo and propofol drip. SOPHIE Aguillon at bedside and aware of pts VS and to titrate for a  MAP greater than 65. PA made aware of pts fever and elevated Lactate of 5.7. Pt unstable at this time for CT and SOPHIE Mai aware that pt is to be transported straight to the MICU.  RN at bedside for transfer to the MICU with RT. Bedside report given to JOSE Ceja who will continue to monitor pts care @ this time. Assumed pt care @ 1930, at bedside with Day shift JOSE De La Rosa for bedside report. Pt ST and irregular on cardiac monitor, belly breathing with resp in the 40s, alert but no oriented at this time. Resident Dr. Olivares @ bedside with Dayshift RN and myself, and RN asked if we should give Tylenol for fever and MD said no. Pt has CBI going but it is not working. RN asked Dr. Schmitt if pt should be placed on NRB at this time and was told no. Repeat labs drawn. Escalated the situation to the attending, Dr. Garcia came to bedside @ 1950 and attempted to change Berry and flush and get out clots. Bedside Bladder scanner preformed greater than 550 in bladder. ER Doctor Dr. Macario @ bedside helping Dr. Garcia. Dr. Ardon from Urology on phone with Dr. Garcia talking her through what to do. Dr. Ardon not coming in until scans are back.  Made aware that pt is not stable for that at this time. As her Urology pt to not be reconnected to CBI and to just flush Berry catheter. MICU SOPHIE Aguillon @ bedside for assessment. Pt brought to the critical care room for stat intubation @2038. Dr. Diaz from  ACS @ bedside for pt, poss surgical stat candidate. Pt placed on Levo and propofol drip. SOPHIE Aguillon at bedside and aware of pts VS and to titrate for a  MAP greater than 65. PA made aware of pts fever and elevated Lactate of 5.7. Pt unstable at this time for CT and SOPHIE Mai aware that pt is to be transported straight to the MICU.  RN at bedside for transfer to the MICU with RT. Bedside report given to JOSE Ceja who will continue to monitor pts care @ this time.

## 2018-01-04 NOTE — H&P ADULT - NSHPREVIEWOFSYSTEMS_GEN_ALL_CORE
He/She denies weight loss/gain, fevers, chills, diaphoresis, poor appetite, altered mental status, LOC, seizure, headache, lightheadedness, dizziness, nasal congestion, dysphagia/odynophagia, hearing loss, chest pain, palpitations, shortness of breath, cough, wheezing/stridor, abdominal pain/cramping, nausea, vomiting, diarrhea, constipation, hematochezia/melena, bladder/bowel incontinence. Denies abnormal bleeding. Denies myalgias/arthralgias, pruritis.    Also denies recent travel, recent sick contacts. Denies recent medication changes. Up to date with age appropriate immunizations. Up to date with age appropriate malignancy screenings (last colonoscopy - , last mammography). Last DXA performed -. As stated, poor historian so somewhat limited ROS. He denies headache, lightheadedness, dizziness, nasal congestion, dysphagia/odynophagia, hearing loss, chest pain, palpitations, shortness of breath, cough, wheezing/stridor. Admits to some lower quadrant abdominal pain. Denies cramping, nausea, vomiting, diarrhea, constipation, hematochezia/melena.    Last office exam for prostate check within past 1yr, normal per pt.

## 2018-01-04 NOTE — ED PROVIDER NOTE - CRITICAL CARE PROVIDED
additional history taking/documentation/interpretation of diagnostic studies/consultation with other physicians/direct patient care (not related to procedure)

## 2018-01-05 ENCOUNTER — TRANSCRIPTION ENCOUNTER (OUTPATIENT)
Age: 83
End: 2018-01-05

## 2018-01-05 DIAGNOSIS — R33.8 OTHER RETENTION OF URINE: ICD-10-CM

## 2018-01-05 DIAGNOSIS — I48.91 UNSPECIFIED ATRIAL FIBRILLATION: ICD-10-CM

## 2018-01-05 DIAGNOSIS — I10 ESSENTIAL (PRIMARY) HYPERTENSION: ICD-10-CM

## 2018-01-05 DIAGNOSIS — A41.9 SEPSIS, UNSPECIFIED ORGANISM: ICD-10-CM

## 2018-01-05 DIAGNOSIS — N41.9 INFLAMMATORY DISEASE OF PROSTATE, UNSPECIFIED: ICD-10-CM

## 2018-01-05 DIAGNOSIS — N17.9 ACUTE KIDNEY FAILURE, UNSPECIFIED: ICD-10-CM

## 2018-01-05 DIAGNOSIS — R41.82 ALTERED MENTAL STATUS, UNSPECIFIED: ICD-10-CM

## 2018-01-05 DIAGNOSIS — J96.01 ACUTE RESPIRATORY FAILURE WITH HYPOXIA: ICD-10-CM

## 2018-01-05 DIAGNOSIS — E87.2 ACIDOSIS: ICD-10-CM

## 2018-01-05 LAB
APPEARANCE UR: ABNORMAL
BACTERIA # UR AUTO: ABNORMAL
BILIRUB UR-MCNC: NEGATIVE — SIGNIFICANT CHANGE UP
COLOR SPEC: ABNORMAL
COMMENT - URINE: SIGNIFICANT CHANGE UP
DIFF PNL FLD: ABNORMAL
EPI CELLS # UR: SIGNIFICANT CHANGE UP
GAS PNL BLDA: SIGNIFICANT CHANGE UP
GLUCOSE UR QL: NEGATIVE MG/DL — SIGNIFICANT CHANGE UP
GRAN CASTS # UR COMP ASSIST: ABNORMAL /LPF
HCT VFR BLD CALC: 42.8 % — SIGNIFICANT CHANGE UP (ref 42–52)
HGB BLD-MCNC: 14.1 G/DL — SIGNIFICANT CHANGE UP (ref 14–18)
HYALINE CASTS # UR AUTO: ABNORMAL /LPF
INR BLD: 1.81 RATIO — HIGH (ref 0.88–1.16)
KETONES UR-MCNC: NEGATIVE — SIGNIFICANT CHANGE UP
LACTATE BLDV-MCNC: 4.3 MMOL/L — CRITICAL HIGH (ref 0.5–2)
LEUKOCYTE ESTERASE UR-ACNC: ABNORMAL
MAGNESIUM SERPL-MCNC: 1.6 MG/DL — SIGNIFICANT CHANGE UP (ref 1.6–2.6)
NITRITE UR-MCNC: NEGATIVE — SIGNIFICANT CHANGE UP
PH UR: 6 — SIGNIFICANT CHANGE UP (ref 5–8)
PHOSPHATE SERPL-MCNC: 4.5 MG/DL — SIGNIFICANT CHANGE UP (ref 2.4–4.7)
PROT UR-MCNC: 100 MG/DL
PROTHROM AB SERPL-ACNC: 20.2 SEC — HIGH (ref 9.8–12.7)
RBC CASTS # UR COMP ASSIST: ABNORMAL /HPF (ref 0–4)
SP GR SPEC: 1 — LOW (ref 1.01–1.02)
UROBILINOGEN FLD QL: NEGATIVE MG/DL — SIGNIFICANT CHANGE UP
WBC UR QL: >50

## 2018-01-05 PROCEDURE — 71045 X-RAY EXAM CHEST 1 VIEW: CPT | Mod: 26

## 2018-01-05 PROCEDURE — 99291 CRITICAL CARE FIRST HOUR: CPT

## 2018-01-05 PROCEDURE — 93306 TTE W/DOPPLER COMPLETE: CPT | Mod: 26

## 2018-01-05 RX ORDER — FENTANYL CITRATE 50 UG/ML
25 INJECTION INTRAVENOUS EVERY 4 HOURS
Qty: 0 | Refills: 0 | Status: DISCONTINUED | OUTPATIENT
Start: 2018-01-05 | End: 2018-01-06

## 2018-01-05 RX ORDER — SODIUM BICARBONATE 1 MEQ/ML
0.24 SYRINGE (ML) INTRAVENOUS
Qty: 150 | Refills: 0 | Status: DISCONTINUED | OUTPATIENT
Start: 2018-01-05 | End: 2018-01-05

## 2018-01-05 RX ORDER — POTASSIUM CHLORIDE 20 MEQ
10 PACKET (EA) ORAL
Qty: 0 | Refills: 0 | Status: COMPLETED | OUTPATIENT
Start: 2018-01-05 | End: 2018-01-05

## 2018-01-05 RX ORDER — NOREPINEPHRINE BITARTRATE/D5W 8 MG/250ML
0.05 PLASTIC BAG, INJECTION (ML) INTRAVENOUS
Qty: 16 | Refills: 0 | Status: DISCONTINUED | OUTPATIENT
Start: 2018-01-05 | End: 2018-01-05

## 2018-01-05 RX ORDER — THIAMINE MONONITRATE (VIT B1) 100 MG
200 TABLET ORAL EVERY 12 HOURS
Qty: 0 | Refills: 0 | Status: DISCONTINUED | OUTPATIENT
Start: 2018-01-05 | End: 2018-01-08

## 2018-01-05 RX ORDER — SODIUM CHLORIDE 9 MG/ML
1000 INJECTION, SOLUTION INTRAVENOUS
Qty: 0 | Refills: 0 | Status: DISCONTINUED | OUTPATIENT
Start: 2018-01-05 | End: 2018-01-05

## 2018-01-05 RX ORDER — CALCIUM GLUCONATE 100 MG/ML
2 VIAL (ML) INTRAVENOUS ONCE
Qty: 0 | Refills: 0 | Status: COMPLETED | OUTPATIENT
Start: 2018-01-05 | End: 2018-01-05

## 2018-01-05 RX ORDER — CEFEPIME 1 G/1
1000 INJECTION, POWDER, FOR SOLUTION INTRAMUSCULAR; INTRAVENOUS EVERY 12 HOURS
Qty: 0 | Refills: 0 | Status: DISCONTINUED | OUTPATIENT
Start: 2018-01-05 | End: 2018-01-07

## 2018-01-05 RX ORDER — DEXMEDETOMIDINE HYDROCHLORIDE IN 0.9% SODIUM CHLORIDE 4 UG/ML
0.01 INJECTION INTRAVENOUS
Qty: 200 | Refills: 0 | Status: DISCONTINUED | OUTPATIENT
Start: 2018-01-05 | End: 2018-01-05

## 2018-01-05 RX ORDER — VASOPRESSIN 20 [USP'U]/ML
0.04 INJECTION INTRAVENOUS
Qty: 100 | Refills: 0 | Status: DISCONTINUED | OUTPATIENT
Start: 2018-01-05 | End: 2018-01-05

## 2018-01-05 RX ORDER — ACETAMINOPHEN 500 MG
650 TABLET ORAL EVERY 6 HOURS
Qty: 0 | Refills: 0 | Status: DISCONTINUED | OUTPATIENT
Start: 2018-01-05 | End: 2018-01-11

## 2018-01-05 RX ORDER — HYDROCORTISONE 20 MG
50 TABLET ORAL EVERY 6 HOURS
Qty: 0 | Refills: 0 | Status: COMPLETED | OUTPATIENT
Start: 2018-01-05 | End: 2018-01-09

## 2018-01-05 RX ORDER — FINASTERIDE 5 MG/1
5 TABLET, FILM COATED ORAL DAILY
Qty: 0 | Refills: 0 | Status: DISCONTINUED | OUTPATIENT
Start: 2018-01-05 | End: 2018-01-11

## 2018-01-05 RX ORDER — SODIUM CHLORIDE 9 MG/ML
1000 INJECTION, SOLUTION INTRAVENOUS ONCE
Qty: 0 | Refills: 0 | Status: COMPLETED | OUTPATIENT
Start: 2018-01-05 | End: 2018-01-05

## 2018-01-05 RX ORDER — ASCORBIC ACID 60 MG
1500 TABLET,CHEWABLE ORAL EVERY 6 HOURS
Qty: 0 | Refills: 0 | Status: DISCONTINUED | OUTPATIENT
Start: 2018-01-05 | End: 2018-01-05

## 2018-01-05 RX ORDER — THIAMINE MONONITRATE (VIT B1) 100 MG
200 TABLET ORAL EVERY 12 HOURS
Qty: 0 | Refills: 0 | Status: DISCONTINUED | OUTPATIENT
Start: 2018-01-05 | End: 2018-01-05

## 2018-01-05 RX ORDER — POTASSIUM CHLORIDE 20 MEQ
20 PACKET (EA) ORAL ONCE
Qty: 0 | Refills: 0 | Status: COMPLETED | OUTPATIENT
Start: 2018-01-05 | End: 2018-01-05

## 2018-01-05 RX ORDER — ASCORBIC ACID 60 MG
1500 TABLET,CHEWABLE ORAL EVERY 6 HOURS
Qty: 0 | Refills: 0 | Status: COMPLETED | OUTPATIENT
Start: 2018-01-05 | End: 2018-01-09

## 2018-01-05 RX ADMIN — Medication 103 MILLIGRAM(S): at 17:39

## 2018-01-05 RX ADMIN — Medication 50 MILLIGRAM(S): at 17:39

## 2018-01-05 RX ADMIN — Medication 8.76 MICROGRAM(S)/KG/MIN: at 02:35

## 2018-01-05 RX ADMIN — CHLORHEXIDINE GLUCONATE 15 MILLILITER(S): 213 SOLUTION TOPICAL at 06:42

## 2018-01-05 RX ADMIN — Medication 103 MILLIGRAM(S): at 23:23

## 2018-01-05 RX ADMIN — Medication 150 MEQ/KG/HR: at 16:46

## 2018-01-05 RX ADMIN — Medication 200 GRAM(S): at 09:29

## 2018-01-05 RX ADMIN — Medication 103 MILLIGRAM(S): at 09:29

## 2018-01-05 RX ADMIN — Medication 103 MILLIGRAM(S): at 12:18

## 2018-01-05 RX ADMIN — Medication 8.76 MICROGRAM(S)/KG/MIN: at 06:42

## 2018-01-05 RX ADMIN — Medication 8.76 MICROGRAM(S)/KG/MIN: at 11:47

## 2018-01-05 RX ADMIN — Medication 50 MILLIGRAM(S): at 09:01

## 2018-01-05 RX ADMIN — Medication 100 MILLIEQUIVALENT(S): at 09:01

## 2018-01-05 RX ADMIN — Medication 250 MILLIGRAM(S): at 22:30

## 2018-01-05 RX ADMIN — FINASTERIDE 5 MILLIGRAM(S): 5 TABLET, FILM COATED ORAL at 12:19

## 2018-01-05 RX ADMIN — Medication 100 MILLIEQUIVALENT(S): at 03:05

## 2018-01-05 RX ADMIN — HEPARIN SODIUM 5000 UNIT(S): 5000 INJECTION INTRAVENOUS; SUBCUTANEOUS at 06:42

## 2018-01-05 RX ADMIN — SODIUM CHLORIDE 150 MILLILITER(S): 9 INJECTION, SOLUTION INTRAVENOUS at 16:46

## 2018-01-05 RX ADMIN — Medication 102 MILLIGRAM(S): at 09:29

## 2018-01-05 RX ADMIN — Medication 100 MILLIEQUIVALENT(S): at 02:00

## 2018-01-05 RX ADMIN — Medication 102 MILLIGRAM(S): at 17:39

## 2018-01-05 RX ADMIN — Medication 8.76 MICROGRAM(S)/KG/MIN: at 08:19

## 2018-01-05 RX ADMIN — VASOPRESSIN 2.4 UNIT(S)/MIN: 20 INJECTION INTRAVENOUS at 02:35

## 2018-01-05 RX ADMIN — Medication 50 MILLIGRAM(S): at 12:19

## 2018-01-05 RX ADMIN — HEPARIN SODIUM 5000 UNIT(S): 5000 INJECTION INTRAVENOUS; SUBCUTANEOUS at 23:23

## 2018-01-05 RX ADMIN — SODIUM CHLORIDE 1000 MILLILITER(S): 9 INJECTION, SOLUTION INTRAVENOUS at 08:19

## 2018-01-05 RX ADMIN — HEPARIN SODIUM 5000 UNIT(S): 5000 INJECTION INTRAVENOUS; SUBCUTANEOUS at 14:36

## 2018-01-05 RX ADMIN — CEFEPIME 100 MILLIGRAM(S): 1 INJECTION, POWDER, FOR SOLUTION INTRAMUSCULAR; INTRAVENOUS at 17:38

## 2018-01-05 RX ADMIN — Medication 150 MEQ/KG/HR: at 02:37

## 2018-01-05 RX ADMIN — PIPERACILLIN AND TAZOBACTAM 25 GRAM(S): 4; .5 INJECTION, POWDER, LYOPHILIZED, FOR SOLUTION INTRAVENOUS at 06:43

## 2018-01-05 RX ADMIN — SODIUM CHLORIDE 150 MILLILITER(S): 9 INJECTION, SOLUTION INTRAVENOUS at 09:10

## 2018-01-05 RX ADMIN — Medication 50 MILLIGRAM(S): at 23:23

## 2018-01-05 NOTE — CONSULT NOTE ADULT - PROBLEM SELECTOR RECOMMENDATION 6
- likely related to acute urinary retention  - downtrending now after cystoscopy and urine removal  - trend BUN/Cr, monitor lytes  - avoid nephrotoxic agents

## 2018-01-05 NOTE — CONSULT NOTE ADULT - PROBLEM SELECTOR RECOMMENDATION 5
- likely metabolic encephalopathy  - treat underlying causes as stated above  - monitor mental status for acute changes

## 2018-01-05 NOTE — CONSULT NOTE ADULT - SUBJECTIVE AND OBJECTIVE BOX
Patient is a 82y old  Male who presents with a chief complaint of urinary retention (2018 16:51)      BRIEF HOSPITAL COURSE: ***    Events last 24 hours: ***    PAST MEDICAL & SURGICAL HISTORY:  HTN (hypertension)  Afib  No significant past surgical history: could not assess, pt altered    Allergies    No Known Allergies    Intolerances      FAMILY HISTORY:  No pertinent family history: could not assess, pt altered      Review of Systems:  CONSTITUTIONAL: No fever, chills, or fatigue  EYES: No eye pain, visual disturbances, or discharge  ENMT:  No difficulty hearing, tinnitus, vertigo; No sinus or throat pain  NECK: No pain or stiffness  RESPIRATORY: No cough, wheezing, chills or hemoptysis; No shortness of breath  CARDIOVASCULAR: No chest pain, palpitations, dizziness, or leg swelling  GASTROINTESTINAL: No abdominal or epigastric pain. No nausea, vomiting, or hematemesis; No diarrhea or constipation. No melena or hematochezia.  GENITOURINARY: No dysuria, frequency, hematuria, or incontinence  NEUROLOGICAL: No headaches, memory loss, loss of strength, numbness, or tremors  SKIN: No itching, burning, rashes, or lesions   MUSCULOSKELETAL: No joint pain or swelling; No muscle, back, or extremity pain  PSYCHIATRIC: No depression, anxiety, mood swings, or difficulty sleeping      Medications:  piperacillin/tazobactam IVPB. 3.375 Gram(s) IV Intermittent every 8 hours  vancomycin  IVPB 1000 milliGRAM(s) IV Intermittent once    norepinephrine Infusion 0.05 MICROgram(s)/kG/Min IV Continuous <Continuous>      acetaminophen  Suppository 650 milliGRAM(s) Rectal every 6 hours PRN  dexmedetomidine Infusion 0.013 MICROgram(s)/kG/Hr IV Continuous <Continuous>  fentaNYL    Injectable 25 MICROGram(s) IV Push every 4 hours PRN  oxyCODONE    5 mG/acetaminophen 325 mG 1 Tablet(s) Oral every 6 hours PRN      heparin  Injectable 5000 Unit(s) SubCutaneous every 8 hours    pantoprazole  Injectable 40 milliGRAM(s) IV Push daily      vasopressin Infusion 0.04 Unit(s)/Min IV Continuous <Continuous>    potassium chloride  10 mEq/100 mL IVPB 10 milliEquivalent(s) IV Intermittent every 1 hour  sodium bicarbonate  Infusion 0.241 mEq/kG/Hr IV Continuous <Continuous>      chlorhexidine 0.12% Liquid 15 milliLiter(s) Swish and Spit two times a day            ICU Vital Signs Last 24 Hrs  T(C): 40.3 (2018 21:15), Max: 40.3 (2018 21:15)  T(F): 104.6 (2018 21:15), Max: 104.6 (2018 21:15)  HR: 143 (2018 21:36) (79 - 145)  BP: 122/82 (2018 21:36) (70/46 - 147/91)  BP(mean): --  ABP: --  ABP(mean): --  RR: 25 (2018 21:36) (18 - 45)  SpO2: 97% (2018 21:36) (92% - 100%)    Vital Signs Last 24 Hrs  T(C): 40.3 (2018 21:15), Max: 40.3 (2018 21:15)  T(F): 104.6 (2018 21:15), Max: 104.6 (2018 21:15)  HR: 143 (2018 21:36) (79 - 145)  BP: 122/82 (2018 21:36) (70/46 - 147/91)  BP(mean): --  RR: 25 (2018 21:36) (18 - 45)  SpO2: 97% (2018 21:36) (92% - 100%)    ABG - ( 2018 20:06 )  pH: 7.40  /  pCO2: 26    /  pO2: 159   / HCO3: 19    / Base Excess: -7.3  /  SaO2: 99                  I&O's Detail        LABS:                        13.1   24.8  )-----------( 213      ( 2018 23:01 )             39.9     01-04    139  |  108<H>  |  37.0<H>  ----------------------------<  109  3.4<L>   |  14.0<L>  |  1.67<H>    Ca    7.3<L>      2018 23:01  Phos  3.0     01-04  Mg     1.6     01-04    TPro  5.1<L>  /  Alb  2.5<L>  /  TBili  0.7  /  DBili  x   /  AST  32  /  ALT  9   /  AlkPhos  62  -          CAPILLARY BLOOD GLUCOSE      POCT Blood Glucose.: 100 mg/dL (2018 18:58)    PT/INR - ( 2018 01:50 )   PT: 20.2 sec;   INR: 1.81 ratio           Urinalysis Basic - ( 2018 01:47 )    Color: Berta / Appearance: Cloudy / S.005 / pH: x  Gluc: x / Ketone: Negative  / Bili: Negative / Urobili: Negative mg/dL   Blood: x / Protein: 100 mg/dL / Nitrite: Negative   Leuk Esterase: Moderate / RBC: >50 /HPF / WBC >50   Sq Epi: x / Non Sq Epi: Occasional / Bacteria: Occasional      CULTURES:      Physical Examination:    General: No acute distress.  Alert, oriented, interactive, nonfocal    HEENT: Pupils equal, reactive to light.  Symmetric.    PULM: Clear to auscultation bilaterally, no significant sputum production    CVS: Regular rate and rhythm, no murmurs, rubs, or gallops    ABD: Soft, nondistended, nontender, normoactive bowel sounds, no masses    EXT: No edema, nontender    SKIN: Warm and well perfused, no rashes noted.    NEURO: A&Ox3, strength 5/5 all extremities, cranial nerves grossly intact, no focal deficits    RADIOLOGY: ***    CRITICAL CARE TIME SPENT: ***  Evaluating/treating patient, reviewing data/labs/imaging, discussing case with multidisciplinary team, discussing plan/goals of care with patient/family. Non-inclusive of procedure time. Patient is a 82y old  Male who presents with a chief complaint of urinary retention (2018 16:51)      BRIEF HOSPITAL COURSE: 81 y/o M with a h/o HTN, AFib (on Xarelto), ventral hernia, has had indwelling malcolm for the past 2 weeks related to urinary retention- removed yesterday, presents to ED with urinary retention and progressive abdominal distention. Malcolm placed in ED initially drained well, but became blood tinged and was changed to CBI. Going forward the malcolm appeared to be malpositioned or up against a large clot- unable to aspirate urine. Repositioning was attempted several times by ED/hospital staff and urology was consulted. During this time the patient developed AMS, respiratory distress, HARPREET, worsening metabolic acidosis, and lactic acidosis. Patient was ultimately intubated for hypoxic respiratory failure and was subsequently started on Levophed infusion for BP support, initially suspected to be secondary to intubation induction agents, however, appears to now have a septic shock component. Patient was taken to OR         PAST MEDICAL & SURGICAL HISTORY:  HTN (hypertension)  Afib  No significant past surgical history: could not assess, pt altered    Allergies    No Known Allergies    Intolerances      FAMILY HISTORY:  No pertinent family history: could not assess, pt altered      Review of Systems:  CONSTITUTIONAL: No fever, chills, or fatigue  EYES: No eye pain, visual disturbances, or discharge  ENMT:  No difficulty hearing, tinnitus, vertigo; No sinus or throat pain  NECK: No pain or stiffness  RESPIRATORY: No cough, wheezing, chills or hemoptysis; No shortness of breath  CARDIOVASCULAR: No chest pain, palpitations, dizziness, or leg swelling  GASTROINTESTINAL: No abdominal or epigastric pain. No nausea, vomiting, or hematemesis; No diarrhea or constipation. No melena or hematochezia.  GENITOURINARY: No dysuria, frequency, hematuria, or incontinence  NEUROLOGICAL: No headaches, memory loss, loss of strength, numbness, or tremors  SKIN: No itching, burning, rashes, or lesions   MUSCULOSKELETAL: No joint pain or swelling; No muscle, back, or extremity pain  PSYCHIATRIC: No depression, anxiety, mood swings, or difficulty sleeping      Medications:  piperacillin/tazobactam IVPB. 3.375 Gram(s) IV Intermittent every 8 hours  vancomycin  IVPB 1000 milliGRAM(s) IV Intermittent once    norepinephrine Infusion 0.05 MICROgram(s)/kG/Min IV Continuous <Continuous>      acetaminophen  Suppository 650 milliGRAM(s) Rectal every 6 hours PRN  dexmedetomidine Infusion 0.013 MICROgram(s)/kG/Hr IV Continuous <Continuous>  fentaNYL    Injectable 25 MICROGram(s) IV Push every 4 hours PRN  oxyCODONE    5 mG/acetaminophen 325 mG 1 Tablet(s) Oral every 6 hours PRN      heparin  Injectable 5000 Unit(s) SubCutaneous every 8 hours    pantoprazole  Injectable 40 milliGRAM(s) IV Push daily      vasopressin Infusion 0.04 Unit(s)/Min IV Continuous <Continuous>    potassium chloride  10 mEq/100 mL IVPB 10 milliEquivalent(s) IV Intermittent every 1 hour  sodium bicarbonate  Infusion 0.241 mEq/kG/Hr IV Continuous <Continuous>      chlorhexidine 0.12% Liquid 15 milliLiter(s) Swish and Spit two times a day            ICU Vital Signs Last 24 Hrs  T(C): 40.3 (2018 21:15), Max: 40.3 (2018 21:15)  T(F): 104.6 (2018 21:15), Max: 104.6 (2018 21:15)  HR: 143 (2018 21:36) (79 - 145)  BP: 122/82 (2018 21:36) (70/46 - 147/91)  BP(mean): --  ABP: --  ABP(mean): --  RR: 25 (2018 21:36) (18 - 45)  SpO2: 97% (2018 21:36) (92% - 100%)    Vital Signs Last 24 Hrs  T(C): 40.3 (2018 21:15), Max: 40.3 (2018 21:15)  T(F): 104.6 (2018 21:15), Max: 104.6 (2018 21:15)  HR: 143 (2018 21:36) (79 - 145)  BP: 122/82 (2018 21:36) (70/46 - 147/91)  BP(mean): --  RR: 25 (2018 21:36) (18 - 45)  SpO2: 97% (2018 21:36) (92% - 100%)    ABG - ( 2018 20:06 )  pH: 7.40  /  pCO2: 26    /  pO2: 159   / HCO3: 19    / Base Excess: -7.3  /  SaO2: 99                  I&O's Detail        LABS:                        13.1   24.8  )-----------( 213      ( 2018 23:01 )             39.9     01-04    139  |  108<H>  |  37.0<H>  ----------------------------<  109  3.4<L>   |  14.0<L>  |  1.67<H>    Ca    7.3<L>      2018 23:01  Phos  3.0     -  Mg     1.6     -    TPro  5.1<L>  /  Alb  2.5<L>  /  TBili  0.7  /  DBili  x   /  AST  32  /  ALT  9   /  AlkPhos  62            CAPILLARY BLOOD GLUCOSE      POCT Blood Glucose.: 100 mg/dL (2018 18:58)    PT/INR - ( 2018 01:50 )   PT: 20.2 sec;   INR: 1.81 ratio           Urinalysis Basic - ( 2018 01:47 )    Color: Berta / Appearance: Cloudy / S.005 / pH: x  Gluc: x / Ketone: Negative  / Bili: Negative / Urobili: Negative mg/dL   Blood: x / Protein: 100 mg/dL / Nitrite: Negative   Leuk Esterase: Moderate / RBC: >50 /HPF / WBC >50   Sq Epi: x / Non Sq Epi: Occasional / Bacteria: Occasional      CULTURES:      Physical Examination:    General: No acute distress.  Alert, oriented, interactive, nonfocal    HEENT: Pupils equal, reactive to light.  Symmetric.    PULM: Clear to auscultation bilaterally, no significant sputum production    CVS: Regular rate and rhythm, no murmurs, rubs, or gallops    ABD: Soft, nondistended, nontender, normoactive bowel sounds, no masses    EXT: No edema, nontender    SKIN: Warm and well perfused, no rashes noted.    NEURO: A&Ox3, strength 5/5 all extremities, cranial nerves grossly intact, no focal deficits    RADIOLOGY: ***    CRITICAL CARE TIME SPENT: ***  Evaluating/treating patient, reviewing data/labs/imaging, discussing case with multidisciplinary team, discussing plan/goals of care with patient/family. Non-inclusive of procedure time. Patient is a 82y old  Male who presents with a chief complaint of urinary retention (2018 16:51)      BRIEF HOSPITAL COURSE: 83 y/o M with a h/o HTN, AFib (on Xarelto), ventral hernia, has had indwelling malcolm for the past 2 weeks related to urinary retention- removed yesterday, presents to ED with urinary retention and progressive abdominal distention. Malcolm placed in ED initially drained well, but became blood tinged and was changed to CBI. Going forward the malcolm appeared to be malpositioned or up against a large clot- unable to aspirate urine. Repositioning was attempted several times by ED/hospital staff and urology was consulted. During this time the patient developed AMS, respiratory distress, HARPREET, worsening metabolic acidosis, and lactic acidosis. Patient was ultimately intubated for hypoxic respiratory failure and was subsequently started on Levophed infusion for BP support, initially suspected to be secondary to intubation induction agents, however, appears to now have a septic shock component. Patient was taken to OR for cystoscopy- injury to the prostate was found likely secondary to traumatic malcolm insertion. 1500cc+ urine removed and new malcolm placed. Chronic prostatitis suspected- now with SIRS/septic shock after malcolm balloon inflated inside prostate. Tmax: 104 degrees.          PAST MEDICAL & SURGICAL HISTORY:  HTN (hypertension)  Afib  No significant past surgical history: could not assess, pt altered    Allergies    No Known Allergies    Intolerances      FAMILY HISTORY:  No pertinent family history: could not assess, pt altered      Review of Systems: Unable to obtain, patient sedated and intubated      Medications:  piperacillin/tazobactam IVPB. 3.375 Gram(s) IV Intermittent every 8 hours  vancomycin  IVPB 1000 milliGRAM(s) IV Intermittent once  norepinephrine Infusion 0.05 MICROgram(s)/kG/Min IV Continuous <Continuous>  acetaminophen  Suppository 650 milliGRAM(s) Rectal every 6 hours PRN  dexmedetomidine Infusion 0.013 MICROgram(s)/kG/Hr IV Continuous <Continuous>  fentaNYL    Injectable 25 MICROGram(s) IV Push every 4 hours PRN  oxyCODONE    5 mG/acetaminophen 325 mG 1 Tablet(s) Oral every 6 hours PRN  heparin  Injectable 5000 Unit(s) SubCutaneous every 8 hours  pantoprazole  Injectable 40 milliGRAM(s) IV Push daily  vasopressin Infusion 0.04 Unit(s)/Min IV Continuous <Continuous>  potassium chloride  10 mEq/100 mL IVPB 10 milliEquivalent(s) IV Intermittent every 1 hour  sodium bicarbonate  Infusion 0.241 mEq/kG/Hr IV Continuous <Continuous>  chlorhexidine 0.12% Liquid 15 milliLiter(s) Swish and Spit two times a day        ICU Vital Signs Last 24 Hrs  T(C): 40.3 (2018 21:15), Max: 40.3 (2018 21:15)  T(F): 104.6 (2018 21:15), Max: 104.6 (2018 21:15)  HR: 143 (2018 21:36) (79 - 145)  BP: 122/82 (2018 21:36) (70/46 - 147/91)  BP(mean): --  ABP: --  ABP(mean): --  RR: 25 (2018 21:36) (18 - 45)  SpO2: 97% (2018 21:36) (92% - 100%)    Vital Signs Last 24 Hrs  T(C): 40.3 (2018 21:15), Max: 40.3 (2018 21:15)  T(F): 104.6 (2018 21:15), Max: 104.6 (2018 21:15)  HR: 143 (2018 21:36) (79 - 145)  BP: 122/82 (2018 21:36) (70/46 - 147/91)  BP(mean): --  RR: 25 (2018 21:36) (18 - 45)  SpO2: 97% (2018 21:36) (92% - 100%)    ABG - ( 2018 20:06 )  pH: 7.40  /  pCO2: 26    /  pO2: 159   / HCO3: 19    / Base Excess: -7.3  /  SaO2: 99                  I&O's Detail        LABS:                        13.1   24.8  )-----------( 213      ( 2018 23:01 )             39.9     01-04    139  |  108<H>  |  37.0<H>  ----------------------------<  109  3.4<L>   |  14.0<L>  |  1.67<H>    Ca    7.3<L>      2018 23:01  Phos  3.0     -  Mg     1.6     -    TPro  5.1<L>  /  Alb  2.5<L>  /  TBili  0.7  /  DBili  x   /  AST  32  /  ALT  9   /  AlkPhos  62            CAPILLARY BLOOD GLUCOSE      POCT Blood Glucose.: 100 mg/dL (2018 18:58)    PT/INR - ( 2018 01:50 )   PT: 20.2 sec;   INR: 1.81 ratio           Urinalysis Basic - ( 2018 01:47 )    Color: Berta / Appearance: Cloudy / S.005 / pH: x  Gluc: x / Ketone: Negative  / Bili: Negative / Urobili: Negative mg/dL   Blood: x / Protein: 100 mg/dL / Nitrite: Negative   Leuk Esterase: Moderate / RBC: >50 /HPF / WBC >50   Sq Epi: x / Non Sq Epi: Occasional / Bacteria: Occasional      CULTURES:      Physical Examination:    General: No acute distress.  sedated, intubated    HEENT: Pupils equal, reactive to light.  Symmetric.    PULM: Clear to auscultation bilaterally, no significant sputum production    CVS: tachycardic, irregularly irregular rhythm, no murmurs, rubs, or gallops    ABD: Soft, distended, nontender, normoactive bowel sounds, no masses, large ventral hernia    EXT: No edema, nontender    SKIN: Warm and well perfused, no rashes noted.    NEURO: sedated, moves all extremities spontaneously with 5/5 strength    RADIOLOGY: CXR's not officially read, appear to possibly show b/l small pleural effusions    CRITICAL CARE TIME SPENT: 120 mins  Evaluating/treating patient, reviewing data/labs/imaging, discussing case with multidisciplinary team, discussing plan/goals of care with patient/family. Non-inclusive of procedure time.

## 2018-01-05 NOTE — PROVIDER CONTACT NOTE (EICU) - ASSESSMENT
Resp failure/prostatitis/difficult malcolm insertion/obstructive uropathy
Uroseptic, post renal obstruction

## 2018-01-05 NOTE — CONSULT NOTE ADULT - PROBLEM SELECTOR RECOMMENDATION 2
- cannot exclude relation to worsening metabolic acidosis and tachypnea as respiratory compensation- eventually fatigued  - continue mechanical ventilation, will titrate settings to maintain SaO2 > 92% and wean as tolerated  - currently requiring high FiO2, CXR does not correlate, will repeat ABG  - Precedex for sedation

## 2018-01-05 NOTE — PROVIDER CONTACT NOTE (EICU) - RECOMMENDATIONS
Vanco/Zosyn for prostatitis; fentanyl for pain, levophed has come off. Wean in early AM. trend cr. IV fluids
Pt on their way to OR, cont IV abx, pressor support, beta blockade d/c'd

## 2018-01-05 NOTE — PROCEDURE NOTE - NSPROCDETAILS_GEN_ALL_CORE
sterile technique, catheter placed/lumen(s) aspirated and flushed/ultrasound guidance/guidewire recovered/sterile dressing applied

## 2018-01-05 NOTE — PROGRESS NOTE ADULT - SUBJECTIVE AND OBJECTIVE BOX
Patient is a 82y old  Male who presents with a chief complaint of urinary retention (2018 16:51)      BRIEF HOSPITAL COURSE: ***    Events last 24 hours: ***    PAST MEDICAL & SURGICAL HISTORY:  HTN (hypertension)  Afib  No significant past surgical history: could not assess, pt altered      Review of Systems:  CONSTITUTIONAL: No fever, chills, or fatigue  EYES: No eye pain, visual disturbances, or discharge  ENMT:  No difficulty hearing, tinnitus, vertigo; No sinus or throat pain  NECK: No pain or stiffness  RESPIRATORY: No cough, wheezing, chills or hemoptysis; No shortness of breath  CARDIOVASCULAR: No chest pain, palpitations, dizziness, or leg swelling  GASTROINTESTINAL: No abdominal or epigastric pain. No nausea, vomiting, or hematemesis; No diarrhea or constipation. No melena or hematochezia.  GENITOURINARY: No dysuria, frequency, hematuria, or incontinence  NEUROLOGICAL: No headaches, memory loss, loss of strength, numbness, or tremors  SKIN: No itching, burning, rashes, or lesions   MUSCULOSKELETAL: No joint pain or swelling; No muscle, back, or extremity pain  PSYCHIATRIC: No depression, anxiety, mood swings, or difficulty sleeping      Medications:  cefepime  IVPB 1000 milliGRAM(s) IV Intermittent every 12 hours    norepinephrine Infusion 0.05 MICROgram(s)/kG/Min IV Continuous <Continuous>      acetaminophen  Suppository 650 milliGRAM(s) Rectal every 6 hours PRN  fentaNYL    Injectable 25 MICROGram(s) IV Push every 4 hours PRN  oxyCODONE    5 mG/acetaminophen 325 mG 1 Tablet(s) Oral every 6 hours PRN      heparin  Injectable 5000 Unit(s) SubCutaneous every 8 hours        finasteride 5 milliGRAM(s) Oral daily  hydrocortisone sodium succinate Injectable 50 milliGRAM(s) IV Push every 6 hours    ascorbic acid IVPB 1500 milliGRAM(s) IV Intermittent every 6 hours  thiamine IVPB 200 milliGRAM(s) IV Intermittent every 12 hours            Mode: CPAP with PS  FiO2: 40  PEEP: 5  PS: 5      ICU Vital Signs Last 24 Hrs  T(C): 37 (2018 16:00), Max: 40.3 (2018 21:15)  T(F): 98.6 (2018 16:00), Max: 104.6 (2018 21:15)  HR: 75 (2018 18:30) (65 - 145)  BP: 80/50 (2018 18:30) (70/46 - 145/95)  BP(mean): 60 (2018 18:30) (60 - 104)  ABP: 84/41 (2018 18:30) (76/45 - 146/68)  ABP(mean): 54 (2018 18:30) (54 - 97)  RR: 18 (2018 18:30) (12 - 45)  SpO2: 94% (2018 18:30) (91% - 100%)      ABG - ( 2018 06:38 )  pH: 7.29  /  pCO2: 37    /  pO2: 107   / HCO3: 18    / Base Excess: -8.0  /  SaO2: 98                  I&O's Detail    2018 07:01  -  2018 07:00  --------------------------------------------------------  IN:    dexmedetomidine Infusion: 9.6 mL    norepinephrine Infusion: 360 mL    sodium bicarbonate  Infusion: 1050 mL    sodium chloride 0.9%: 150 mL    vasopressin Infusion: 12 mL  Total IN: 1581.6 mL    OUT:    Indwelling Catheter - Urethral: 940 mL  Total OUT: 940 mL    Total NET: 641.6 mL      2018 07:01  -  2018 18:58  --------------------------------------------------------  IN:    multiple electrolytes Injection Type 1multiple electrolytes Injection Type 1: 1350 mL    norepinephrine Infusion: 530.7 mL    Oral Fluid: 400 mL    sodium bicarbonate  Infusion: 1500 mL    Solution: 1000 mL    Solution: 100 mL    Solution: 300 mL    Solution: 100 mL    Solution: 200 mL    Solution: 100 mL    vasopressin Infusion: 24 mL  Total IN: 5604.7 mL    OUT:    Indwelling Catheter - Urethral: 940 mL  Total OUT: 940 mL    Total NET: 4664.7 mL            LABS:                        14.1   x     )-----------( x        ( 2018 01:43 )             42.8     01-04    139  |  108<H>  |  37.0<H>  ----------------------------<  109  3.4<L>   |  14.0<L>  |  1.67<H>    Ca    7.3<L>      2018 23:01  Phos  4.5       Mg     1.6         TPro  5.1<L>  /  Alb  2.5<L>  /  TBili  0.7  /  DBili  x   /  AST  32  /  ALT  9   /  AlkPhos  62            CAPILLARY BLOOD GLUCOSE      POCT Blood Glucose.: 100 mg/dL (2018 18:58)    PT/INR - ( 2018 01:50 )   PT: 20.2 sec;   INR: 1.81 ratio           Urinalysis Basic - ( 2018 01:47 )    Color: Berta / Appearance: Cloudy / S.005 / pH: x  Gluc: x / Ketone: Negative  / Bili: Negative / Urobili: Negative mg/dL   Blood: x / Protein: 100 mg/dL / Nitrite: Negative   Leuk Esterase: Moderate / RBC: >50 /HPF / WBC >50   Sq Epi: x / Non Sq Epi: Occasional / Bacteria: Occasional      CULTURES:      Physical Examination:    General: No acute distress.  Alert, oriented, interactive, nonfocal    HEENT: Pupils equal, reactive to light.  Symmetric.    PULM: Clear to auscultation bilaterally, no significant sputum production    CVS: Regular rate and rhythm, no murmurs, rubs, or gallops    ABD: Soft, nondistended, nontender, normoactive bowel sounds, no masses    EXT: No edema, nontender    SKIN: Warm and well perfused, no rashes noted.    RADIOLOGY: ***    CRITICAL CARE TIME SPENT: *** Patient is a 82y old  Male who presents with a chief complaint of urinary retention (2018 16:51)      BRIEF HOSPITAL COURSE: 81 y/o M with a h/o HTN, AFib (on Xarelto), ventral hernia, has had indwelling malcolm for the past 2 weeks related to urinary retention- removed yesterday, presents to ED with urinary retention and progressive abdominal distention. Went into septic shock had to go to or for cystoscopy and was had some clots and trauma to prostate from traumatic malcolm insertion along with chronic prostatitis. Intubated requiring vasopressors. Lactic acidosis    Events last 24 hours: extubated this am, titrating off vasopressors    PAST MEDICAL & SURGICAL HISTORY:  HTN (hypertension)  Afib  No significant past surgical history: could not assess, pt altered      Review of Systems:  CONSTITUTIONAL: No fever, chills, or fatigue  EYES: No eye pain, visual disturbances, or discharge  ENMT:  No difficulty hearing, tinnitus, vertigo; No sinus or throat pain  NECK: No pain or stiffness  RESPIRATORY: No cough, wheezing, chills or hemoptysis; No shortness of breath  CARDIOVASCULAR: No chest pain, palpitations, dizziness, or leg swelling  GASTROINTESTINAL: No abdominal or epigastric pain. No nausea, vomiting, or hematemesis; No diarrhea or constipation. No melena or hematochezia.  GENITOURINARY: No dysuria, frequency, hematuria, or incontinence  NEUROLOGICAL: No headaches, memory loss, loss of strength, numbness, or tremors  SKIN: No itching, burning, rashes, or lesions   MUSCULOSKELETAL: No joint pain or swelling; No muscle, back, or extremity pain  PSYCHIATRIC: No depression, anxiety, mood swings, or difficulty sleeping      Medications:  cefepime  IVPB 1000 milliGRAM(s) IV Intermittent every 12 hours    norepinephrine Infusion 0.05 MICROgram(s)/kG/Min IV Continuous <Continuous>      acetaminophen  Suppository 650 milliGRAM(s) Rectal every 6 hours PRN  fentaNYL    Injectable 25 MICROGram(s) IV Push every 4 hours PRN  oxyCODONE    5 mG/acetaminophen 325 mG 1 Tablet(s) Oral every 6 hours PRN      heparin  Injectable 5000 Unit(s) SubCutaneous every 8 hours        finasteride 5 milliGRAM(s) Oral daily  hydrocortisone sodium succinate Injectable 50 milliGRAM(s) IV Push every 6 hours    ascorbic acid IVPB 1500 milliGRAM(s) IV Intermittent every 6 hours  thiamine IVPB 200 milliGRAM(s) IV Intermittent every 12 hours            Mode: CPAP with PS  FiO2: 40  PEEP: 5  PS: 5      ICU Vital Signs Last 24 Hrs  T(C): 37 (2018 16:00), Max: 40.3 (2018 21:15)  T(F): 98.6 (2018 16:00), Max: 104.6 (2018 21:15)  HR: 75 (2018 18:30) (65 - 145)  BP: 80/50 (2018 18:30) (70/46 - 145/95)  BP(mean): 60 (2018 18:30) (60 - 104)  ABP: 84/41 (2018 18:30) (76/45 - 146/68)  ABP(mean): 54 (2018 18:30) (54 - 97)  RR: 18 (2018 18:30) (12 - 45)  SpO2: 94% (2018 18:30) (91% - 100%)      ABG - ( 2018 06:38 )  pH: 7.29  /  pCO2: 37    /  pO2: 107   / HCO3: 18    / Base Excess: -8.0  /  SaO2: 98                  I&O's Detail    2018 07:01  -  2018 07:00  --------------------------------------------------------  IN:    dexmedetomidine Infusion: 9.6 mL    norepinephrine Infusion: 360 mL    sodium bicarbonate  Infusion: 1050 mL    sodium chloride 0.9%: 150 mL    vasopressin Infusion: 12 mL  Total IN: 1581.6 mL    OUT:    Indwelling Catheter - Urethral: 940 mL  Total OUT: 940 mL    Total NET: 641.6 mL      2018 07:01  -  2018 18:58  --------------------------------------------------------  IN:    multiple electrolytes Injection Type 1multiple electrolytes Injection Type 1: 1350 mL    norepinephrine Infusion: 530.7 mL    Oral Fluid: 400 mL    sodium bicarbonate  Infusion: 1500 mL    Solution: 1000 mL    Solution: 100 mL    Solution: 300 mL    Solution: 100 mL    Solution: 200 mL    Solution: 100 mL    vasopressin Infusion: 24 mL  Total IN: 5604.7 mL    OUT:    Indwelling Catheter - Urethral: 940 mL  Total OUT: 940 mL    Total NET: 4664.7 mL            LABS:                        14.1   x     )-----------( x        ( 2018 01:43 )             42.8     01-    139  |  108<H>  |  37.0<H>  ----------------------------<  109  3.4<L>   |  14.0<L>  |  1.67<H>    Ca    7.3<L>      2018 23:01  Phos  4.5     -  Mg     1.6         TPro  5.1<L>  /  Alb  2.5<L>  /  TBili  0.7  /  DBili  x   /  AST  32  /  ALT  9   /  AlkPhos  62  -          CAPILLARY BLOOD GLUCOSE      POCT Blood Glucose.: 100 mg/dL (2018 18:58)    PT/INR - ( 2018 01:50 )   PT: 20.2 sec;   INR: 1.81 ratio           Urinalysis Basic - ( 2018 01:47 )    Color: Berta / Appearance: Cloudy / S.005 / pH: x  Gluc: x / Ketone: Negative  / Bili: Negative / Urobili: Negative mg/dL   Blood: x / Protein: 100 mg/dL / Nitrite: Negative   Leuk Esterase: Moderate / RBC: >50 /HPF / WBC >50   Sq Epi: x / Non Sq Epi: Occasional / Bacteria: Occasional      CULTURES:      Physical Examination:    General: No acute distress.  Alert, oriented, interactive, nonfocal    HEENT: Pupils equal, reactive to light.  Symmetric.    PULM: diminshed    CVS: Regular rate and rhythm, no murmurs, rubs, or gallops    ABD: Soft, nondistended, nontender, normoactive bowel sounds, no masses    EXT: + edema, nontender    SKIN: Warm and well perfused, no rashes noted.    RADIOLOGY: < from: TTE Echo Complete w/Doppler (18 @ 11:10) >   Summary:   1. Technically adequate study.   2. Severely decreased global left ventricular systolic function.   3. Left ventricular ejection fraction, by visual estimation, is 30 to   35%.   4. Multiple left ventricular regional wall motion abnormalities exist.   See wall motion findings.   5. There is moderate eccentric left ventricular hypertrophy.   6. Moderately enlarged right ventricle.   7. Severely reduced RV systolic function.   8. Severely enlarged left atrium.   9. Severely dilated right atrium.  10. Mild mitral annular calcification.  11. Thickening of the anterior and posterior mitral valve leaflets.  12. Moderate to severe mitral valve regurgitation.  13. Sclerotic aortic valve with normal opening.  14. Moderate aortic regurgitation.  15. Moderate tricuspid regurgitation. Normal estimated PASP, probably   underestimated due to JENNIFER.  16. Trivial pericardial effusion.     MD Jessica Electronically signed on 2018 at 4:19:15 PM          CRITICAL CARE TIME SPENT: 40 mins

## 2018-01-05 NOTE — PROGRESS NOTE ADULT - ASSESSMENT
82 yom with urinary retention with septic shock, acute respiratory failure, CHF, afib, lactic acidosis

## 2018-01-05 NOTE — CONSULT NOTE ADULT - SUBJECTIVE AND OBJECTIVE BOX
INTERVAL HPI/OVERNIGHT EVENTS:  This is an 82 year old man who has history of hernia who presented to ED in urinary retention. He has been followed by Dr. Schumer for urinary retention, and had a malcolm in for 2 weeks  It was removed in the office yesterday.    A malcolm catheter was initially placed with output of 1L.    subsequently, because it became bloody, the malcolm was changed to a CBI and output was light pink tinged.    the urine cleared up, and by report, CBI was stopped.    Patient had a sudden change in mental status, respiratory distress and abdominal distension.      I was reconsulted.  Labs at this point were lactate of 5 and creatinine 1.8 from <1 at admission.    He was intubated and is now in the MICU.  malcolm is unable to be irrigated or repositioned.  only 3 cc are in the balloon at the time of my exam.      ROS unable to be obtained due to patient condition.      MEDICATIONS  (STANDING):  artificial  tears Solution 1 Drop(s) Both EYES three times a day  atorvastatin 40 milliGRAM(s) Oral at bedtime  ceFAZolin   IVPB      ceFAZolin   IVPB 2000 milliGRAM(s) IV Intermittent every 8 hours  dextrose 5% 1000 milliLiter(s) (125 mL/Hr) IV Continuous <Continuous>  enoxaparin Injectable 90 milliGRAM(s) SubCutaneous two times a day  folic acid 1 milliGRAM(s) Oral daily  gabapentin 100 milliGRAM(s) Oral daily  hydrocortisone sodium succinate Injectable 50 milliGRAM(s) IV Push every 12 hours  saccharomyces boulardii 250 milliGRAM(s) Oral two times a day    MEDICATIONS  (PRN):  HYDROmorphone  Injectable 0.5 milliGRAM(s) IV Push every 4 hours PRN Moderate Pain (4 - 6)  HYDROmorphone  Injectable 1 milliGRAM(s) IV Push every 4 hours PRN Severe Pain (7 - 10)      Vital Signs Last 24 Hrs  T(C): 36.6 (04 Jan 2018 15:44), Max: 36.6 (03 Jan 2018 23:00)  T(F): 97.9 (04 Jan 2018 15:44), Max: 97.9 (03 Jan 2018 23:00)  HR: 75 (04 Jan 2018 15:44) (72 - 75)  BP: 119/71 (04 Jan 2018 15:44) (119/71 - 142/68)  BP(mean): --  RR: 20 (04 Jan 2018 15:44) (20 - 20)  SpO2: 95% (04 Jan 2018 15:44) (95% - 97%)    PHYSICAL EXAM:  Exam:  intubated  Abdomen: large midline incision.  large ventral hernia.  Bladder hard  Malcolm unable to be irrigated, nothing in bag.     I&O's Detail    03 Jan 2018 07:01  -  04 Jan 2018 07:00  --------------------------------------------------------  IN:    Oral Fluid: 300 mL    sodium chloride 0.45%: 350 mL    Solution: 50 mL  Total IN: 700 mL    OUT:    Indwelling Catheter - Urethral: 1475 mL  Total OUT: 1475 mL    Total NET: -775 mL      04 Jan 2018 07:01  -  04 Jan 2018 22:09  --------------------------------------------------------  IN:  Total IN: 0 mL    OUT:    Indwelling Catheter - Urethral: 1275 mL  Total OUT: 1275 mL    Total NET: -1275 mL          LABS:                        12.3   17.8  )-----------( 94       ( 03 Jan 2018 06:09 )             38.5     01-03    155<H>  |  108<H>  |  75.0<H>  ----------------------------<  137<H>  3.3<L>   |  32.0<H>  |  1.43<H>    Ca    8.3<L>      03 Jan 2018 06:09  Phos  4.0     01-03  Mg     2.8     01-03      PT/INR - ( 03 Jan 2018 06:09 )   PT: 11.5 sec;   INR: 1.04 ratio               RADIOLOGY & ADDITIONAL STUDIES:

## 2018-01-05 NOTE — PROVIDER CONTACT NOTE (EICU) - BACKGROUND
82 M hx BPH, had required indwelling malcolm which was recently D/Uri. Developed urinary retention and resp distress, Malcolm inserted with clotted blood, taken to OR by urology had cystoscopy and malcolm placement, multiple blood clots, BPH no obvious prostatic masses on cysto.
Evaluating new admission

## 2018-01-05 NOTE — CONSULT NOTE ADULT - PROBLEM SELECTOR RECOMMENDATION 7
- rate currently in 120's  - likely related to sepsis and Levophed  - will consider switching to phenylephrine infusion if rate continues to climb  - start Xarelto when patient's son brings in medication list (INR: 1.8)

## 2018-01-06 DIAGNOSIS — I42.8 OTHER CARDIOMYOPATHIES: ICD-10-CM

## 2018-01-06 DIAGNOSIS — I48.91 UNSPECIFIED ATRIAL FIBRILLATION: ICD-10-CM

## 2018-01-06 DIAGNOSIS — Z29.9 ENCOUNTER FOR PROPHYLACTIC MEASURES, UNSPECIFIED: ICD-10-CM

## 2018-01-06 DIAGNOSIS — I10 ESSENTIAL (PRIMARY) HYPERTENSION: ICD-10-CM

## 2018-01-06 DIAGNOSIS — N39.0 URINARY TRACT INFECTION, SITE NOT SPECIFIED: ICD-10-CM

## 2018-01-06 DIAGNOSIS — N41.9 INFLAMMATORY DISEASE OF PROSTATE, UNSPECIFIED: ICD-10-CM

## 2018-01-06 DIAGNOSIS — I48.2 CHRONIC ATRIAL FIBRILLATION: ICD-10-CM

## 2018-01-06 LAB
ANION GAP SERPL CALC-SCNC: 13 MMOL/L — SIGNIFICANT CHANGE UP (ref 5–17)
BUN SERPL-MCNC: 28 MG/DL — HIGH (ref 8–20)
CALCIUM SERPL-MCNC: 8 MG/DL — LOW (ref 8.6–10.2)
CHLORIDE SERPL-SCNC: 102 MMOL/L — SIGNIFICANT CHANGE UP (ref 98–107)
CO2 SERPL-SCNC: 24 MMOL/L — SIGNIFICANT CHANGE UP (ref 22–29)
CREAT SERPL-MCNC: 0.93 MG/DL — SIGNIFICANT CHANGE UP (ref 0.5–1.3)
GLUCOSE SERPL-MCNC: 92 MG/DL — SIGNIFICANT CHANGE UP (ref 70–115)
HCT VFR BLD CALC: 37.2 % — LOW (ref 42–52)
HGB BLD-MCNC: 12.3 G/DL — LOW (ref 14–18)
LACTATE BLDV-MCNC: 1.7 MMOL/L — SIGNIFICANT CHANGE UP (ref 0.5–2)
MAGNESIUM SERPL-MCNC: 1.9 MG/DL — SIGNIFICANT CHANGE UP (ref 1.6–2.6)
MCHC RBC-ENTMCNC: 30.8 PG — SIGNIFICANT CHANGE UP (ref 27–31)
MCHC RBC-ENTMCNC: 33.1 G/DL — SIGNIFICANT CHANGE UP (ref 32–36)
MCV RBC AUTO: 93 FL — SIGNIFICANT CHANGE UP (ref 80–94)
PHOSPHATE SERPL-MCNC: 3.1 MG/DL — SIGNIFICANT CHANGE UP (ref 2.4–4.7)
PLATELET # BLD AUTO: 162 K/UL — SIGNIFICANT CHANGE UP (ref 150–400)
POTASSIUM SERPL-MCNC: 3.6 MMOL/L — SIGNIFICANT CHANGE UP (ref 3.5–5.3)
POTASSIUM SERPL-SCNC: 3.6 MMOL/L — SIGNIFICANT CHANGE UP (ref 3.5–5.3)
RBC # BLD: 4 M/UL — LOW (ref 4.6–6.2)
RBC # FLD: 14 % — SIGNIFICANT CHANGE UP (ref 11–15.6)
SODIUM SERPL-SCNC: 139 MMOL/L — SIGNIFICANT CHANGE UP (ref 135–145)
WBC # BLD: 34.1 K/UL — HIGH (ref 4.8–10.8)
WBC # FLD AUTO: 34.1 K/UL — HIGH (ref 4.8–10.8)

## 2018-01-06 PROCEDURE — 93010 ELECTROCARDIOGRAM REPORT: CPT

## 2018-01-06 PROCEDURE — 99233 SBSQ HOSP IP/OBS HIGH 50: CPT

## 2018-01-06 RX ORDER — BENZOCAINE AND MENTHOL 5; 1 G/100ML; G/100ML
1 LIQUID ORAL
Qty: 0 | Refills: 0 | Status: DISCONTINUED | OUTPATIENT
Start: 2018-01-06 | End: 2018-01-11

## 2018-01-06 RX ORDER — GLYCERIN 1 %
1 DROPS OPHTHALMIC (EYE)
Qty: 0 | Refills: 0 | Status: DISCONTINUED | OUTPATIENT
Start: 2018-01-06 | End: 2018-01-11

## 2018-01-06 RX ORDER — POLYETHYLENE GLYCOL 3350 17 G/17G
17 POWDER, FOR SOLUTION ORAL DAILY
Qty: 0 | Refills: 0 | Status: DISCONTINUED | OUTPATIENT
Start: 2018-01-06 | End: 2018-01-07

## 2018-01-06 RX ADMIN — BENZOCAINE AND MENTHOL 1 LOZENGE: 5; 1 LIQUID ORAL at 23:55

## 2018-01-06 RX ADMIN — Medication 2 DROP(S): at 18:13

## 2018-01-06 RX ADMIN — HEPARIN SODIUM 5000 UNIT(S): 5000 INJECTION INTRAVENOUS; SUBCUTANEOUS at 05:44

## 2018-01-06 RX ADMIN — CEFEPIME 100 MILLIGRAM(S): 1 INJECTION, POWDER, FOR SOLUTION INTRAMUSCULAR; INTRAVENOUS at 05:44

## 2018-01-06 RX ADMIN — Medication 2 DROP(S): at 23:51

## 2018-01-06 RX ADMIN — Medication 102 MILLIGRAM(S): at 05:44

## 2018-01-06 RX ADMIN — Medication 2 DROP(S): at 10:26

## 2018-01-06 RX ADMIN — HEPARIN SODIUM 5000 UNIT(S): 5000 INJECTION INTRAVENOUS; SUBCUTANEOUS at 23:50

## 2018-01-06 RX ADMIN — Medication 103 MILLIGRAM(S): at 17:59

## 2018-01-06 RX ADMIN — CEFEPIME 100 MILLIGRAM(S): 1 INJECTION, POWDER, FOR SOLUTION INTRAMUSCULAR; INTRAVENOUS at 20:13

## 2018-01-06 RX ADMIN — Medication 50 MILLIGRAM(S): at 11:15

## 2018-01-06 RX ADMIN — Medication 102 MILLIGRAM(S): at 17:40

## 2018-01-06 RX ADMIN — FINASTERIDE 5 MILLIGRAM(S): 5 TABLET, FILM COATED ORAL at 14:27

## 2018-01-06 RX ADMIN — Medication 50 MILLIGRAM(S): at 05:45

## 2018-01-06 RX ADMIN — Medication 2 DROP(S): at 01:50

## 2018-01-06 RX ADMIN — HEPARIN SODIUM 5000 UNIT(S): 5000 INJECTION INTRAVENOUS; SUBCUTANEOUS at 14:28

## 2018-01-06 RX ADMIN — BENZOCAINE AND MENTHOL 1 LOZENGE: 5; 1 LIQUID ORAL at 17:59

## 2018-01-06 RX ADMIN — Medication 50 MILLIGRAM(S): at 17:40

## 2018-01-06 RX ADMIN — Medication 103 MILLIGRAM(S): at 11:14

## 2018-01-06 RX ADMIN — POLYETHYLENE GLYCOL 3350 17 GRAM(S): 17 POWDER, FOR SOLUTION ORAL at 11:14

## 2018-01-06 RX ADMIN — Medication 103 MILLIGRAM(S): at 05:43

## 2018-01-06 RX ADMIN — Medication 50 MILLIGRAM(S): at 23:49

## 2018-01-06 NOTE — PROGRESS NOTE ADULT - SUBJECTIVE AND OBJECTIVE BOX
Patient is a 82y old  Male who presents with a chief complaint of urinary retention (2018 16:51)      BRIEF HOSPITAL COURSE:     82M w/ chronic malcolm, had malcolm removed then went into urinary retention. Prostate/uretheral injury with attempted re-insertion in ER, required trip to OR for cysto/clot removal and malcolm placement. Likely prostatic injury with bacterial seeding which led to septis shock, HARPREET, and metabolic acidosis. Initially patient required intubation. 2 pressor shock    Events last 24 hours:   -patient was extubated on 18  -off pressors currently     PAST MEDICAL & SURGICAL HISTORY:  HTN (hypertension)  Afib  No significant past surgical history: could not assess, pt altered      Review of Systems:  CONSTITUTIONAL: No fever, chills, or fatigue  EYES: No eye pain, visual disturbances, or discharge  ENMT:  No difficulty hearing, tinnitus, vertigo; No sinus or throat pain  NECK: No pain or stiffness  RESPIRATORY: No cough, wheezing, chills or hemoptysis; No shortness of breath  CARDIOVASCULAR: No chest pain, palpitations, dizziness, or leg swelling  GASTROINTESTINAL: No abdominal or epigastric pain. No nausea, vomiting, or hematemesis; No diarrhea or constipation. No melena or hematochezia.  GENITOURINARY: No dysuria, frequency, hematuria, or incontinence  NEUROLOGICAL: No headaches, memory loss, loss of strength, numbness, or tremors  SKIN: No itching, burning, rashes, or lesions   MUSCULOSKELETAL: No joint pain or swelling; No muscle, back, or extremity pain  PSYCHIATRIC: No depression, anxiety, mood swings, or difficulty sleeping      Medications:  cefepime  IVPB 1000 milliGRAM(s) IV Intermittent every 12 hours    norepinephrine Infusion 0.05 MICROgram(s)/kG/Min IV Continuous <Continuous>      acetaminophen   Tablet 650 milliGRAM(s) Oral every 6 hours PRN  fentaNYL    Injectable 25 MICROGram(s) IV Push every 4 hours PRN  oxyCODONE    5 mG/acetaminophen 325 mG 1 Tablet(s) Oral every 6 hours PRN      heparin  Injectable 5000 Unit(s) SubCutaneous every 8 hours        finasteride 5 milliGRAM(s) Oral daily  hydrocortisone sodium succinate Injectable 50 milliGRAM(s) IV Push every 6 hours    ascorbic acid IVPB 1500 milliGRAM(s) IV Intermittent every 6 hours  thiamine IVPB 200 milliGRAM(s) IV Intermittent every 12 hours      artificial  tears Solution 2 Drop(s) Both EYES every 4 hours PRN        Mode: CPAP with PS  FiO2: 40  PEEP: 5  PS: 5      ICU Vital Signs Last 24 Hrs  T(C): 36.6 (2018 23:49), Max: 38 (2018 20:00)  T(F): 97.8 (2018 23:49), Max: 100.4 (2018 20:00)  HR: 88 (2018 00:00) (65 - 129)  BP: 80/50 (2018 18:30) (80/50 - 135/83)  BP(mean): 60 (2018 18:30) (60 - 104)  ABP: 117/57 (2018 00:00) (76/45 - 146/68)  ABP(mean): 74 (2018 00:00) (54 - 97)  RR: 26 (2018 00:00) (16 - 36)  SpO2: 96% (2018 00:00) (92% - 100%)      ABG - ( 2018 06:38 )  pH: 7.29  /  pCO2: 37    /  pO2: 107   / HCO3: 18    / Base Excess: -8.0  /  SaO2: 98                  I&O's Detail    2018 07:01  -  2018 07:00  --------------------------------------------------------  IN:    dexmedetomidine Infusion: 9.6 mL    norepinephrine Infusion: 360 mL    sodium bicarbonate  Infusion: 1050 mL    sodium chloride 0.9%: 150 mL    vasopressin Infusion: 12 mL  Total IN: 1581.6 mL    OUT:    Indwelling Catheter - Urethral: 940 mL  Total OUT: 940 mL    Total NET: 641.6 mL      2018 07:01  -  2018 01:37  --------------------------------------------------------  IN:    multiple electrolytes Injection Type 1multiple electrolytes Injection Type 1: 1350 mL    norepinephrine Infusion: 587.7 mL    Oral Fluid: 400 mL    sodium bicarbonate  Infusion: 1500 mL    Solution: 1000 mL    Solution: 100 mL    Solution: 300 mL    Solution: 100 mL    Solution: 200 mL    Solution: 100 mL    vasopressin Infusion: 24 mL  Total IN: 5661.7 mL    OUT:    Indwelling Catheter - Urethral: 1845 mL  Total OUT: 1845 mL    Total NET: 3816.7 mL            LABS:                        14.1   x     )-----------( x        ( 2018 01:43 )             42.8     01-    139  |  108<H>  |  37.0<H>  ----------------------------<  109  3.4<L>   |  14.0<L>  |  1.67<H>    Ca    7.3<L>      2018 23:01  Phos  4.5     -  Mg     1.6         TPro  5.1<L>  /  Alb  2.5<L>  /  TBili  0.7  /  DBili  x   /  AST  32  /  ALT  9   /  AlkPhos  62  -          CAPILLARY BLOOD GLUCOSE      POCT Blood Glucose.: 100 mg/dL (2018 18:58)    PT/INR - ( 2018 01:50 )   PT: 20.2 sec;   INR: 1.81 ratio           Urinalysis Basic - ( 2018 01:47 )    Color: Berta / Appearance: Cloudy / S.005 / pH: x  Gluc: x / Ketone: Negative  / Bili: Negative / Urobili: Negative mg/dL   Blood: x / Protein: 100 mg/dL / Nitrite: Negative   Leuk Esterase: Moderate / RBC: >50 /HPF / WBC >50   Sq Epi: x / Non Sq Epi: Occasional / Bacteria: Occasional      CULTURES:      Physical Examination:    General: No acute distress.  Alert, oriented, interactive, nonfocal    HEENT: Pupils equal, reactive to light.  Symmetric.    PULM: Clear to auscultation bilaterally, no significant sputum production    CVS: Regular rate and rhythm, no murmurs, rubs, or gallops    ABD: Soft, nondistended, nontender, normoactive bowel sounds, no masses    EXT: No edema, nontender    SKIN: Warm and well perfused, no rashes noted.

## 2018-01-06 NOTE — PROGRESS NOTE ADULT - ATTENDING COMMENTS
I saw this patient independently at 0945 and agree with the above with the following additions/exceptions:    He complains of eye discomfort -- mild erythema R eye; treated with artificial tears, mildly improved.  unclear etiology, would follow    He complains of L flank pain, nontender on exam -- may be gaseous in nature, clearly not peritoneal at this time.  Will give miralax (last BM 2 days ago)    He has GNR in urine, continue cefepime, would de-escalate as identification/susceptibility returns today/tomorrow.    I would hold off on xaralto and coordinate with urology service when to restart.  Vasoactive agents were off as of 2345pm and his hemodynamics have been stable since then; urine output 50-100mL/hr since pressors off.  His white count has increased, and he has been on steroids for the "Marik Protocol (hydrocortisone/thiamine/vitamin c); he appears nontoxic, complains of being 'wiped out' but no additional complaints.    OK for transfer to any bed, Dr Medina accepted on his service.

## 2018-01-06 NOTE — PROGRESS NOTE ADULT - SUBJECTIVE AND OBJECTIVE BOX
HOSPITALIST PROGRESS NOTE    ANNY BELLA  [id number]  82yMale    Patient is a 82y old  Male who presents with a chief complaint of urinary retention (2018 16:51)      SUBJECTIVE:   Chart reviewed since admission.  Patient seen and examined at bedside.      OBJECTIVE:  Vital Signs Last 24 Hrs  T(C): 36.9 (2018 15:55), Max: 38 (2018 20:00)  T(F): 98.4 (2018 15:55), Max: 100.4 (2018 20:00)  HR: 100 (2018 15:55) (75 - 106)  BP: 112/75 (2018 15:55) (80/50 - 114/77)  BP(mean): 91 (2018 11:00) (60 - 91)  RR: 18 (2018 15:55) (16 - 35)  SpO2: 96% (2018 15:55) (89% - 97%)    PHYSICAL EXAMINATION  General: NAD[]   nontoxic[]   ill-appearing[]  Obese[]  HEENT: AT/NC[]  PERRLA[]  EOMI[]   Moist oral mucosa[]  Pharyngeal exudates[]  NECK: Supple[]  JVD[] Carotid bruit[]  CVS: RRR[]  Irregular[]  S1+S2[]   Murmur[]  RESP: Fair air entry bilaterally[]   Clear sounds[]   poor effort []  wheeze[]   Crackles[]  GI: Soft[]  Nondistended[]   Nontender[]   Bowel Sounds[]  Mass[]   HSM[]   Ascites[]  : suprapubic tenderness[]   CVA Tenderness[]   Berry[]  MS: FROM[]   Edema[]  CNS: AAOx3[]    Motor strength /5     DTR +    Sensory    Coordination    Gait  INTEG: Skin is Warm[]  dry[] Lesion[] Decubitus[]  PSYCH: Mood, Affect    MONITOR:  CAPILLARY BLOOD GLUCOSE            I&O's Summary    2018 07:01  -  2018 07:00  --------------------------------------------------------  IN: 6011.7 mL / OUT: 2600 mL / NET: 3411.7 mL    2018 07:01  -  2018 17:32  --------------------------------------------------------  IN: 0 mL / OUT: 1075 mL / NET: -1075 mL                            12.3   34.1  )-----------( 162      ( 2018 07:32 )             37.2     PT/INR - ( 2018 01:50 )   PT: 20.2 sec;   INR: 1.81 ratio               139  |  102  |  28.0<H>  ----------------------------<  92  3.6   |  24.0  |  0.93    Ca    8.0<L>      2018 07:32  Phos  3.1       Mg     1.9         TPro  5.1<L>  /  Alb  2.5<L>  /  TBili  0.7  /  DBili  x   /  AST  32  /  ALT  9   /  AlkPhos  62  -04        Urinalysis Basic - ( 2018 01:47 )    Color: Berta / Appearance: Cloudy / S.005 / pH: x  Gluc: x / Ketone: Negative  / Bili: Negative / Urobili: Negative mg/dL   Blood: x / Protein: 100 mg/dL / Nitrite: Negative   Leuk Esterase: Moderate / RBC: >50 /HPF / WBC >50   Sq Epi: x / Non Sq Epi: Occasional / Bacteria: Occasional        Culture:    TTE:    RADIOLOGY        MEDICATIONS  (STANDING):  ascorbic acid IVPB 1500 milliGRAM(s) IV Intermittent every 6 hours  cefepime  IVPB 1000 milliGRAM(s) IV Intermittent every 12 hours  finasteride 5 milliGRAM(s) Oral daily  heparin  Injectable 5000 Unit(s) SubCutaneous every 8 hours  hydrocortisone sodium succinate Injectable 50 milliGRAM(s) IV Push every 6 hours  polyethylene glycol 3350 17 Gram(s) Oral daily  thiamine IVPB 200 milliGRAM(s) IV Intermittent every 12 hours      MEDICATIONS  (PRN):  acetaminophen   Tablet 650 milliGRAM(s) Oral every 6 hours PRN For Temp greater than 38 C (100.4 F)  artificial  tears Solution 2 Drop(s) Both EYES every 4 hours PRN Dry Eyes  benzocaine 15 mG/menthol 3.6 mG Lozenge 1 Lozenge Oral five times a day PRN Sore Throat  naphazoline/pheniramine Solution 1 Drop(s) Both EYES four times a day PRN burning HOSPITALIST PROGRESS NOTE    ANNY BELLA  [id number]  82yMale    Patient is a 82y old  Male who presents with a chief complaint of urinary retention (2018 16:51)      SUBJECTIVE:   Chart reviewed since admission.  Patient seen and examined at bedside for sepsis, urinary retention.  Feels weak.  Denies any fever or chill.  Denies any abdominal pain, nausea or vomiting. LLQ pain earlier- resolved with BM  Denies any dyspnea, chest pain or palpitations      OBJECTIVE:  Vital Signs Last 24 Hrs  T(C): 36.9 (2018 15:55), Max: 38 (2018 20:00)  T(F): 98.4 (2018 15:55), Max: 100.4 (2018 20:00)  HR: 100 (2018 15:55) (75 - 106)  BP: 112/75 (2018 15:55) (80/50 - 114/77)  BP(mean): 91 (2018 11:00) (60 - 91)  RR: 18 (2018 15:55) (16 - 35)  SpO2: 96% (2018 15:55) (89% - 97%)    PHYSICAL EXAMINATION  General: NAD[+]   nontoxic[+]   Elderly male sitting up in chair  HEENT: AT/NC[+]  PERRLA[+]  EOMI[+]  Arcus senilis[+]  NECK: Supple[+]  JVD[] Carotid bruit[]  CVS: RRR[+]  Irregular[]  S1+S2[+]   Murmur[]  RESP: Fair air entry bilaterally[+]   Clear sounds[]   poor effort []  wheeze[-]   Crackles[-]  GI: Soft[+]  Nondistended[+]   Nontender[+]   Bowel Sounds[+]  Mass[]   HSM[]   Ascites[]  : suprapubic tenderness[+]   CVA Tenderness[-]   Berry[+]  MS: FROM[+]   Edema[-]  CNS: AAOx3[+]   grossly intact  INTEG: Skin is Warm[+]  dry[] Lesion[] Decubitus[]  PSYCH: Fair Mood, congruent Affect    MONITOR:  CAPILLARY BLOOD GLUCOSE            I&O's Summary    2018 07:01  -  2018 07:00  --------------------------------------------------------  IN: 6011.7 mL / OUT: 2600 mL / NET: 3411.7 mL    2018 07:01  -  2018 17:32  --------------------------------------------------------  IN: 0 mL / OUT: 1075 mL / NET: -1075 mL                            12.3   34.1  )-----------( 162      ( 2018 07:32 )             37.2     PT/INR - ( 2018 01:50 )   PT: 20.2 sec;   INR: 1.81 ratio               139  |  102  |  28.0<H>  ----------------------------<  92  3.6   |  24.0  |  0.93    Ca    8.0<L>      2018 07:32  Phos  3.1     -06  Mg     1.9         TPro  5.1<L>  /  Alb  2.5<L>  /  TBili  0.7  /  DBili  x   /  AST  32  /  ALT  9   /  AlkPhos  62  01-04        Urinalysis Basic - ( 2018 01:47 )    Color: Berta / Appearance: Cloudy / S.005 / pH: x  Gluc: x / Ketone: Negative  / Bili: Negative / Urobili: Negative mg/dL   Blood: x / Protein: 100 mg/dL / Nitrite: Negative   Leuk Esterase: Moderate / RBC: >50 /HPF / WBC >50   Sq Epi: x / Non Sq Epi: Occasional / Bacteria: Occasional        Culture:  Culture - Blood (18 @ 18:22)    Specimen Source: .Blood Blood-Peripheral    Culture Results:   No growth at 48 hours    Culture - Blood (18 @ 18:20)    Specimen Source: .Blood Blood-Peripheral    Culture Results:   No growth at 48 hours    Culture - Urine (18 @ 00:42)    Specimen Source: .Urine Catheterized    Culture Results:   10,000 - 49,000 CFU/mL Gram Negative Rods Identification and  susceptibility to follow.  Culture in progress      TTE:  < from: TTE Echo Complete w/Doppler (18 @ 11:10) >  EXAM:  ECHO TRANSTHORACIC COMP W DOPP      PROCEDURE DATE:  2018   .      INTERPRETATION:  REPORT:    TRANSTHORACIC ECHOCARDIOGRAM REPORT           Patient Name:   ANNY BELLA Patient Location: Inpatient  Medical Rec #:  [id number] Accession #:      [id number]  Account #:                       Height:           71.7 in 182.0 cm  YOB: 1935         Weight:           205.0 lb 93.00 kg  Patient Age:    82 years         BSA:              2.15 m²  Patient Gender: M          BP:               96/65 mmHg        Date of Exam:        2018 11:10:11 AM  Sonographer:         Amanda Perez  Referring Physician: Sonal Milligan MD     Procedure:     2D Echo/Doppler/Color Doppler Complete.  Indications:   Other shock- R57.8  Diagnosis:     Other shock - R57.8  Study Details: Technically adequate study. Study quality was adversely   affected                 due to portable.           2D AND M-MODE MEASUREMENTS (normal ranges within parentheses):  Left    Normal   Aorta/Left            Normal  Ventricle:                    Atrium:  IVSd (2D):    1.52  (0.7-1.1) Aortic Root   3.91  (2.4-3.7)                 cm             (Mmode):       cm  LVPWd (2D):   1.31  (0.7-1.1) Left Atrium   6.63  (1.9-4.0)                 cm             (Mmode):       cm  LVIDd (2D):   4.97  (3.4-5.7) LA Volume    126.6                 cm             Index        ml/m²  LVIDs (2D):   3.94                 cm  LV FS (2D):   20.7   (>25%)                  %  Relative Wall 0.53   (<0.42)  Thickness     SPECTRAL DOPPLER ANALYSIS (where applicable):  LVOT Vmax:  LVOT VTI:  LVOT Diameter: 2.24 cm     Aortic Insufficiency:  AI Half-time:  599 msec  AI Decel Rate: 2.19 m/s²     Tricuspid Valve and PA/RV Systolic Pressure: TR Max Velocity: 1.66 m/s   RA Pressure: 15 mmHg RVSP/PASP: 26.0 mmHg        PHYSICIAN INTERPRETATION:  Left Ventricle: The left ventricular internal cavity size is normal.   There is moderate eccentric left ventricular hypertrophy.  Global LV systolic function was severely decreased. Left ventricular   ejection fraction, by visual estimation, is 30 to 35%. The left   ventricular diastolic function could not be assessed in this study.  LV Wall Scoring:  The mid and apical inferior septum and basalinferior segment are   dyskinetic.  The apical lateral segment is akinetic. The basal inferolateral segment,  apical anterior segment, apical inferior segment, and apex are   hypokinetic.     Right Ventricle: The right ventricular size is moderately enlarged. RV   systolic function is severely reduced.  Left Atrium: Severely enlarged left atrium.  Right Atrium: The right atrium is severely dilated.  Pericardium: Trivial pericardial effusion is present.  Mitral Valve: Thickening of the anterior and posterior mitral valve   leaflets. There is mild mitral annular calcification. Moderate to severe   mitral valve regurgitation is seen.  Tricuspid Valve: The tricuspid valve is normal in structure. Moderate   tricuspid regurgitation is visualized.  Aortic Valve: The aortic valve is trileaflet. Sclerotic aortic valve with   normal opening. Moderate aortic valve regurgitation is seen.  Pulmonic Valve: Structurally normal pulmonic valve, with normal leaflet   excursion. Mild pulmonic valve regurgitation.  Aorta: The aortic root is normal in size and structure.  Pulmonary Artery: The main pulmonary artery is normal in size.  Venous: The inferior vena cava was dilated, with respiratory size   variation less than 50%.        Summary:   1. Technically adequate study.   2. Severely decreased global left ventricular systolic function.   3. Left ventricular ejection fraction, by visual estimation, is 30 to   35%.   4. Multiple left ventricular regional wall motion abnormalities exist.   See wall motion findings.   5. There is moderate eccentric left ventricular hypertrophy.   6. Moderately enlarged right ventricle.   7. Severely reduced RV systolic function.   8. Severely enlarged left atrium.   9. Severely dilated right atrium.  10. Mild mitral annular calcification.  11. Thickening of the anterior and posterior mitral valve leaflets.  12. Moderate to severe mitral valve regurgitation.  13. Sclerotic aortic valve with normal opening.  14. Moderate aortic regurgitation.  15. Moderate tricuspid regurgitation. Normal estimated PASP, probably   underestimated due to JENNIFER.  16. Trivial pericardial effusion.     MD Jessica Electronically signed on 2018 at 4:19:15 PM                *** Final ***                  JEANNIE DAMON   This document has been electronically signed. 2018 11:10AM    < end of copied text >    RADIOLOGY  < from: Xray Chest 1 View AP-PORTABLE IMMEDIATE (01.05.18 @ 02:35) >   EXAM:  XR CHEST PORTABLE IMMED 1V                          PROCEDURE DATE:  2018          INTERPRETATION:  CHEST AP PORTABLE:    History: Line Placement. Septic shock.    Date and time of exam: 2018 2:23 AM.    Technique: A single AP view of the chest was obtained.    Comparison exam: 2018 9:00 PM.    Findings:  A nasogastric tube has been inserted since the prior examination. The tip   is located in the left subphrenic region. No change in position of ET   tube. The tip is located at the level of the clavicular heads. Persistent   cardiomegaly. No evidence of pneumothorax. Bibasilar atelectasis now   present. Persistent cardiomegaly..    Impression:  Insertion of NG tube since the prior study. Development of bibasilar   atelectasis..                TABITHA MORENO M.D., ATTENDING RADIOLOGIST  This document has been electronically signed. 2018  9:31AM        < end of copied text >        MEDICATIONS  (STANDING):  ascorbic acid IVPB 1500 milliGRAM(s) IV Intermittent every 6 hours  cefepime  IVPB 1000 milliGRAM(s) IV Intermittent every 12 hours  finasteride 5 milliGRAM(s) Oral daily  heparin  Injectable 5000 Unit(s) SubCutaneous every 8 hours  hydrocortisone sodium succinate Injectable 50 milliGRAM(s) IV Push every 6 hours  polyethylene glycol 3350 17 Gram(s) Oral daily  thiamine IVPB 200 milliGRAM(s) IV Intermittent every 12 hours      MEDICATIONS  (PRN):  acetaminophen   Tablet 650 milliGRAM(s) Oral every 6 hours PRN For Temp greater than 38 C (100.4 F)  artificial  tears Solution 2 Drop(s) Both EYES every 4 hours PRN Dry Eyes  benzocaine 15 mG/menthol 3.6 mG Lozenge 1 Lozenge Oral five times a day PRN Sore Throat  naphazoline/pheniramine Solution 1 Drop(s) Both EYES four times a day PRN burning

## 2018-01-06 NOTE — PROGRESS NOTE ADULT - PROBLEM SELECTOR PLAN 1
-shock state has resolved and patient is currently off vasopressors  -will re-start if MAP <60  -will remain on marik protocol (thiamine, vit. c and hydrocort)  -will finish course of cefepime

## 2018-01-06 NOTE — PROGRESS NOTE ADULT - ASSESSMENT
82M w/ chronic malcolm, had malcolm removed then went into urinary retention. Prostate/uretheral injury with attempted re-insertion in ER, required trip to OR for cysto/clot removal and malcolm placement. Likely prostatic injury with bacterial seeding which led to septis shock, HARPREET, and metabolic acidosis. Initially patient required intubation. 2 pressor shock    Events last 24 hours:   -patient was extubated on 1/05/18  -off pressors currently

## 2018-01-06 NOTE — PROGRESS NOTE ADULT - ASSESSMENT
82 year old male with PMH HTN, AF on Xarelto and recent urinary issues presented with urinary retention, encephalopathy and sepsis secondary to lower UTI.  He was taken to the OR and had Berry placed cystoscopically by Dr Ardon on 01/04/17 82 year old male with PMH HTN, AF on Xarelto and recent urinary issues presented with urinary retention, encephalopathy and sepsis secondary to lower UTI.  He was taken to the OR and had Berry placed cystoscopically by Dr Ardon on 01/04/17.  He developed severe sepsis with shock and respiraotry failure and was briefly on pressors and vent support. Responded to treatment and stabilized and being downgraded from ICU.  Sepsis, metabolic acidosis, HARPREET, respiratory failure have resolved. No bacteremia apparent.  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 no evidence of CHF clinically.  AF rate controlled. - no anticoagulation as hematuria.  Leukocytosis secondary to steroids

## 2018-01-06 NOTE — PROGRESS NOTE ADULT - PROBLEM SELECTOR PLAN 1
IV abx    FU BC and UC    Continue the malcolm.  He will not have an inpatient trial of void.     will follow

## 2018-01-06 NOTE — PROGRESS NOTE ADULT - SUBJECTIVE AND OBJECTIVE BOX
INTERVAL HPI/OVERNIGHT EVENTS:  Resting comfortably.  Clear yellow urine in the malcolm.    MEDICATIONS  (STANDING):  ascorbic acid IVPB 1500 milliGRAM(s) IV Intermittent every 6 hours  cefepime  IVPB 1000 milliGRAM(s) IV Intermittent every 12 hours  finasteride 5 milliGRAM(s) Oral daily  heparin  Injectable 5000 Unit(s) SubCutaneous every 8 hours  hydrocortisone sodium succinate Injectable 50 milliGRAM(s) IV Push every 6 hours  polyethylene glycol 3350 17 Gram(s) Oral daily  thiamine IVPB 200 milliGRAM(s) IV Intermittent every 12 hours    MEDICATIONS  (PRN):  acetaminophen   Tablet 650 milliGRAM(s) Oral every 6 hours PRN For Temp greater than 38 C (100.4 F)  artificial  tears Solution 2 Drop(s) Both EYES every 4 hours PRN Dry Eyes  benzocaine 15 mG/menthol 3.6 mG Lozenge 1 Lozenge Oral five times a day PRN Sore Throat  naphazoline/pheniramine Solution 1 Drop(s) Both EYES four times a day PRN burning      Allergies    No Known Allergies    Intolerances        Vital Signs Last 24 Hrs  T(C): 36.9 (2018 15:55), Max: 36.9 (2018 15:55)  T(F): 98.4 (2018 15:55), Max: 98.4 (2018 15:55)  HR: 78 (2018 20:26) (78 - 100)  BP: 109/76 (2018 20:26) (100/68 - 114/77)  BP(mean): 91 (2018 11:00) (80 - 91)  RR: 18 (2018 20:26) (17 - 32)  SpO2: 95% (2018 20:26) (89% - 97%)     ON PE:  General: alert and awake  Abdomen: soft, nt, nd, bs+    UC- Positive            LABS:                        12.3   34.1  )-----------( 162      ( 2018 07:32 )             37.2     01-06    139  |  102  |  28.0<H>  ----------------------------<  92  3.6   |  24.0  |  0.93    Ca    8.0<L>      2018 07:32  Phos  3.1     01-  Mg     1.9     -    TPro  5.1<L>  /  Alb  2.5<L>  /  TBili  0.7  /  DBili  x   /  AST  32  /  ALT  9   /  AlkPhos  62  -    PT/INR - ( 2018 01:50 )   PT: 20.2 sec;   INR: 1.81 ratio           Urinalysis Basic - ( 2018 01:47 )    Color: Berta / Appearance: Cloudy / S.005 / pH: x  Gluc: x / Ketone: Negative  / Bili: Negative / Urobili: Negative mg/dL   Blood: x / Protein: 100 mg/dL / Nitrite: Negative   Leuk Esterase: Moderate / RBC: >50 /HPF / WBC >50   Sq Epi: x / Non Sq Epi: Occasional / Bacteria: Occasional        RADIOLOGY & ADDITIONAL TESTS:

## 2018-01-07 DIAGNOSIS — A41.59 OTHER GRAM-NEGATIVE SEPSIS: ICD-10-CM

## 2018-01-07 DIAGNOSIS — R33.9 RETENTION OF URINE, UNSPECIFIED: ICD-10-CM

## 2018-01-07 DIAGNOSIS — N40.0 BENIGN PROSTATIC HYPERPLASIA WITHOUT LOWER URINARY TRACT SYMPTOMS: ICD-10-CM

## 2018-01-07 LAB
-  AMIKACIN: SIGNIFICANT CHANGE UP
-  AMPICILLIN/SULBACTAM: SIGNIFICANT CHANGE UP
-  AMPICILLIN: SIGNIFICANT CHANGE UP
-  AZTREONAM: SIGNIFICANT CHANGE UP
-  CEFAZOLIN: SIGNIFICANT CHANGE UP
-  CEFEPIME: SIGNIFICANT CHANGE UP
-  CEFOXITIN: SIGNIFICANT CHANGE UP
-  CEFTAZIDIME: SIGNIFICANT CHANGE UP
-  CEFTRIAXONE: SIGNIFICANT CHANGE UP
-  CIPROFLOXACIN: SIGNIFICANT CHANGE UP
-  ERTAPENEM: SIGNIFICANT CHANGE UP
-  GENTAMICIN: SIGNIFICANT CHANGE UP
-  IMIPENEM: SIGNIFICANT CHANGE UP
-  LEVOFLOXACIN: SIGNIFICANT CHANGE UP
-  MEROPENEM: SIGNIFICANT CHANGE UP
-  NITROFURANTOIN: SIGNIFICANT CHANGE UP
-  PIPERACILLIN/TAZOBACTAM: SIGNIFICANT CHANGE UP
-  TOBRAMYCIN: SIGNIFICANT CHANGE UP
-  TRIMETHOPRIM/SULFAMETHOXAZOLE: SIGNIFICANT CHANGE UP
ALBUMIN SERPL ELPH-MCNC: 2.6 G/DL — LOW (ref 3.3–5.2)
ALP SERPL-CCNC: 79 U/L — SIGNIFICANT CHANGE UP (ref 40–120)
ALT FLD-CCNC: 14 U/L — SIGNIFICANT CHANGE UP
ANION GAP SERPL CALC-SCNC: 13 MMOL/L — SIGNIFICANT CHANGE UP (ref 5–17)
ANISOCYTOSIS BLD QL: SLIGHT — SIGNIFICANT CHANGE UP
AST SERPL-CCNC: 29 U/L — SIGNIFICANT CHANGE UP
BILIRUB SERPL-MCNC: 0.5 MG/DL — SIGNIFICANT CHANGE UP (ref 0.4–2)
BUN SERPL-MCNC: 31 MG/DL — HIGH (ref 8–20)
CALCIUM SERPL-MCNC: 8.4 MG/DL — LOW (ref 8.6–10.2)
CHLORIDE SERPL-SCNC: 106 MMOL/L — SIGNIFICANT CHANGE UP (ref 98–107)
CO2 SERPL-SCNC: 27 MMOL/L — SIGNIFICANT CHANGE UP (ref 22–29)
CREAT SERPL-MCNC: 0.81 MG/DL — SIGNIFICANT CHANGE UP (ref 0.5–1.3)
CULTURE RESULTS: SIGNIFICANT CHANGE UP
GLUCOSE SERPL-MCNC: 90 MG/DL — SIGNIFICANT CHANGE UP (ref 70–115)
HCT VFR BLD CALC: 38.3 % — LOW (ref 42–52)
HGB BLD-MCNC: 12.6 G/DL — LOW (ref 14–18)
HYPOCHROMIA BLD QL: SLIGHT — SIGNIFICANT CHANGE UP
LYMPHOCYTES # BLD AUTO: 5 % — LOW (ref 20–55)
MACROCYTES BLD QL: SLIGHT — SIGNIFICANT CHANGE UP
MCHC RBC-ENTMCNC: 30.8 PG — SIGNIFICANT CHANGE UP (ref 27–31)
MCHC RBC-ENTMCNC: 32.9 G/DL — SIGNIFICANT CHANGE UP (ref 32–36)
MCV RBC AUTO: 93.6 FL — SIGNIFICANT CHANGE UP (ref 80–94)
METHOD TYPE: SIGNIFICANT CHANGE UP
MICROCYTES BLD QL: SLIGHT — SIGNIFICANT CHANGE UP
NEUTROPHILS NFR BLD AUTO: 90 % — HIGH (ref 37–73)
NEUTS BAND # BLD: 5 % — SIGNIFICANT CHANGE UP (ref 0–8)
ORGANISM # SPEC MICROSCOPIC CNT: SIGNIFICANT CHANGE UP
ORGANISM # SPEC MICROSCOPIC CNT: SIGNIFICANT CHANGE UP
OVALOCYTES BLD QL SMEAR: SLIGHT — SIGNIFICANT CHANGE UP
PLAT MORPH BLD: NORMAL — SIGNIFICANT CHANGE UP
PLATELET # BLD AUTO: 167 K/UL — SIGNIFICANT CHANGE UP (ref 150–400)
POIKILOCYTOSIS BLD QL AUTO: SLIGHT — SIGNIFICANT CHANGE UP
POTASSIUM SERPL-MCNC: 3.6 MMOL/L — SIGNIFICANT CHANGE UP (ref 3.5–5.3)
POTASSIUM SERPL-SCNC: 3.6 MMOL/L — SIGNIFICANT CHANGE UP (ref 3.5–5.3)
PROT SERPL-MCNC: 5.8 G/DL — LOW (ref 6.6–8.7)
RBC # BLD: 4.09 M/UL — LOW (ref 4.6–6.2)
RBC # FLD: 14.1 % — SIGNIFICANT CHANGE UP (ref 11–15.6)
RBC BLD AUTO: ABNORMAL
SODIUM SERPL-SCNC: 146 MMOL/L — HIGH (ref 135–145)
SPECIMEN SOURCE: SIGNIFICANT CHANGE UP
WBC # BLD: 28.2 K/UL — HIGH (ref 4.8–10.8)
WBC # FLD AUTO: 28.2 K/UL — HIGH (ref 4.8–10.8)

## 2018-01-07 PROCEDURE — 99233 SBSQ HOSP IP/OBS HIGH 50: CPT

## 2018-01-07 PROCEDURE — 99222 1ST HOSP IP/OBS MODERATE 55: CPT

## 2018-01-07 RX ORDER — CEFTRIAXONE 500 MG/1
1 INJECTION, POWDER, FOR SOLUTION INTRAMUSCULAR; INTRAVENOUS ONCE
Qty: 0 | Refills: 0 | Status: COMPLETED | OUTPATIENT
Start: 2018-01-07 | End: 2018-01-07

## 2018-01-07 RX ORDER — IPRATROPIUM/ALBUTEROL SULFATE 18-103MCG
3 AEROSOL WITH ADAPTER (GRAM) INHALATION EVERY 6 HOURS
Qty: 0 | Refills: 0 | Status: DISCONTINUED | OUTPATIENT
Start: 2018-01-07 | End: 2018-01-11

## 2018-01-07 RX ORDER — CEFTRIAXONE 500 MG/1
INJECTION, POWDER, FOR SOLUTION INTRAMUSCULAR; INTRAVENOUS
Qty: 0 | Refills: 0 | Status: DISCONTINUED | OUTPATIENT
Start: 2018-01-07 | End: 2018-01-11

## 2018-01-07 RX ORDER — CEFTRIAXONE 500 MG/1
1 INJECTION, POWDER, FOR SOLUTION INTRAMUSCULAR; INTRAVENOUS EVERY 24 HOURS
Qty: 0 | Refills: 0 | Status: DISCONTINUED | OUTPATIENT
Start: 2018-01-08 | End: 2018-01-11

## 2018-01-07 RX ORDER — LISINOPRIL 2.5 MG/1
2.5 TABLET ORAL DAILY
Qty: 0 | Refills: 0 | Status: DISCONTINUED | OUTPATIENT
Start: 2018-01-07 | End: 2018-01-11

## 2018-01-07 RX ORDER — SPIRONOLACTONE 25 MG/1
12.5 TABLET, FILM COATED ORAL DAILY
Qty: 0 | Refills: 0 | Status: DISCONTINUED | OUTPATIENT
Start: 2018-01-07 | End: 2018-01-11

## 2018-01-07 RX ORDER — SACCHAROMYCES BOULARDII 250 MG
250 POWDER IN PACKET (EA) ORAL
Qty: 0 | Refills: 0 | Status: DISCONTINUED | OUTPATIENT
Start: 2018-01-07 | End: 2018-01-11

## 2018-01-07 RX ORDER — FUROSEMIDE 40 MG
20 TABLET ORAL
Qty: 0 | Refills: 0 | Status: DISCONTINUED | OUTPATIENT
Start: 2018-01-07 | End: 2018-01-09

## 2018-01-07 RX ORDER — ATORVASTATIN CALCIUM 80 MG/1
40 TABLET, FILM COATED ORAL AT BEDTIME
Qty: 0 | Refills: 0 | Status: DISCONTINUED | OUTPATIENT
Start: 2018-01-07 | End: 2018-01-11

## 2018-01-07 RX ADMIN — Medication 103 MILLIGRAM(S): at 14:15

## 2018-01-07 RX ADMIN — Medication 250 MILLIGRAM(S): at 17:47

## 2018-01-07 RX ADMIN — HEPARIN SODIUM 5000 UNIT(S): 5000 INJECTION INTRAVENOUS; SUBCUTANEOUS at 14:14

## 2018-01-07 RX ADMIN — HEPARIN SODIUM 5000 UNIT(S): 5000 INJECTION INTRAVENOUS; SUBCUTANEOUS at 23:22

## 2018-01-07 RX ADMIN — CEFEPIME 100 MILLIGRAM(S): 1 INJECTION, POWDER, FOR SOLUTION INTRAMUSCULAR; INTRAVENOUS at 08:30

## 2018-01-07 RX ADMIN — Medication 50 MILLIGRAM(S): at 14:14

## 2018-01-07 RX ADMIN — Medication 103 MILLIGRAM(S): at 20:34

## 2018-01-07 RX ADMIN — Medication 2 DROP(S): at 05:38

## 2018-01-07 RX ADMIN — Medication 103 MILLIGRAM(S): at 00:46

## 2018-01-07 RX ADMIN — Medication 20 MILLIGRAM(S): at 17:47

## 2018-01-07 RX ADMIN — Medication 103 MILLIGRAM(S): at 05:30

## 2018-01-07 RX ADMIN — ATORVASTATIN CALCIUM 40 MILLIGRAM(S): 80 TABLET, FILM COATED ORAL at 23:22

## 2018-01-07 RX ADMIN — Medication 102 MILLIGRAM(S): at 06:44

## 2018-01-07 RX ADMIN — HEPARIN SODIUM 5000 UNIT(S): 5000 INJECTION INTRAVENOUS; SUBCUTANEOUS at 05:30

## 2018-01-07 RX ADMIN — Medication 50 MILLIGRAM(S): at 23:20

## 2018-01-07 RX ADMIN — FINASTERIDE 5 MILLIGRAM(S): 5 TABLET, FILM COATED ORAL at 14:14

## 2018-01-07 RX ADMIN — Medication 102 MILLIGRAM(S): at 18:27

## 2018-01-07 RX ADMIN — Medication 50 MILLIGRAM(S): at 05:30

## 2018-01-07 RX ADMIN — CEFTRIAXONE 100 GRAM(S): 500 INJECTION, POWDER, FOR SOLUTION INTRAMUSCULAR; INTRAVENOUS at 17:40

## 2018-01-07 RX ADMIN — Medication 50 MILLIGRAM(S): at 17:45

## 2018-01-07 NOTE — CONSULT NOTE ADULT - ASSESSMENT
83 y/o M with a h/o HTN, AFib (on Xarelto), ventral hernia, with septic shock, acute hypoxic respiratory failure requiring intubation, AMS, HARPREET, metabolic acidosis.     Will admit to MICU for further management.
THIS 82 Y.O. MAN WITH ENLARGED PROSTATE, WHO HAD URINARY RETENTION, UTI WITH SEPSIS ON ADMISSION, TRAUMATIC MILES PLACEMENT LEADING TO FURTHER SEPSIS AND BRIEF ICU COURSE.

## 2018-01-07 NOTE — PROGRESS NOTE ADULT - PROBLEM SELECTOR PLAN 3
Started on low dose ACEI, Aldactone and Lasix  Monitor fluid status.  Repeat TTE  Cardiology follow up

## 2018-01-07 NOTE — PROGRESS NOTE ADULT - ASSESSMENT
82 year old male with PMH HTN, AF on Xarelto and recent urinary issues presented with urinary retention, encephalopathy and sepsis secondary to lower UTI.  He was taken to the OR and had Berry placed cystoscopically by Dr Ardon on 01/04/17.  He developed severe sepsis with shock and respiraotry failure and was briefly on pressors and vent support. Responded to treatment and stabilized and being downgraded from ICU.  Sepsis, metabolic acidosis, HARPREET, respiratory failure have resolved. No bacteremia apparent. Urine culture with GNR pending ID/S  TTE showing WMA - CMP likely secondary to sepsis. No evidence of CHF clinically.  AF rate controlled. - No anticoagulation as hematuria.  Leukocytosis secondary to steroids - improving.  Mild hypernatremia likely secondary to saline, asymptomatic  Diarrhea likely secondary to laxatives.  Chronic COPD

## 2018-01-07 NOTE — CONSULT NOTE ADULT - PROBLEM SELECTOR RECOMMENDATION 3
RESOLVING  AS ABOVE
- cannot exclude relation to BPH, however, report states that patient's urologist said that his prostate was not enlarged and removed malcolm yesterday  - new malcolm placed in OR- currently functioning- do not remove  - urology following

## 2018-01-07 NOTE — PROGRESS NOTE ADULT - SUBJECTIVE AND OBJECTIVE BOX
INTERVAL HPI/OVERNIGHT EVENTS:  Feels well.  Berry is draining well.    MEDICATIONS  (STANDING):  ascorbic acid IVPB 1500 milliGRAM(s) IV Intermittent every 6 hours  atorvastatin 40 milliGRAM(s) Oral at bedtime  cefTRIAXone   IVPB      finasteride 5 milliGRAM(s) Oral daily  furosemide   Injectable 20 milliGRAM(s) IV Push two times a day  heparin  Injectable 5000 Unit(s) SubCutaneous every 8 hours  hydrocortisone sodium succinate Injectable 50 milliGRAM(s) IV Push every 6 hours  lisinopril 2.5 milliGRAM(s) Oral daily  saccharomyces boulardii 250 milliGRAM(s) Oral two times a day  spironolactone 12.5 milliGRAM(s) Oral daily  thiamine IVPB 200 milliGRAM(s) IV Intermittent every 12 hours    MEDICATIONS  (PRN):  acetaminophen   Tablet 650 milliGRAM(s) Oral every 6 hours PRN For Temp greater than 38 C (100.4 F)  ALBUTerol/ipratropium for Nebulization 3 milliLiter(s) Nebulizer every 6 hours PRN Shortness of Breath  artificial  tears Solution 2 Drop(s) Both EYES every 4 hours PRN Dry Eyes  benzocaine 15 mG/menthol 3.6 mG Lozenge 1 Lozenge Oral five times a day PRN Sore Throat  naphazoline/pheniramine Solution 1 Drop(s) Both EYES four times a day PRN burning      Allergies    No Known Allergies    Intolerances        Vital Signs Last 24 Hrs  T(C): 37 (07 Jan 2018 17:10), Max: 37 (07 Jan 2018 17:10)  T(F): 98.6 (07 Jan 2018 17:10), Max: 98.6 (07 Jan 2018 17:10)  HR: 88 (07 Jan 2018 17:10) (62 - 106)  BP: 138/86 (07 Jan 2018 17:10) (109/76 - 138/86)  BP(mean): --  RR: 18 (07 Jan 2018 17:10) (18 - 22)  SpO2: 97% (07 Jan 2018 17:10) (90% - 97%)     ON PE:  General: alert and awake  Abdomen: soft, nt, nd, bs+       LABS:                        12.6   28.2  )-----------( 167      ( 07 Jan 2018 08:53 )             38.3     01-07    146<H>  |  106  |  31.0<H>  ----------------------------<  90  3.6   |  27.0  |  0.81    Ca    8.4<L>      07 Jan 2018 08:53  Phos  3.1     01-06  Mg     1.9     01-06    TPro  5.8<L>  /  Alb  2.6<L>  /  TBili  0.5  /  DBili  x   /  AST  29  /  ALT  14  /  AlkPhos  79  01-07          RADIOLOGY & ADDITIONAL TESTS:

## 2018-01-07 NOTE — CONSULT NOTE ADULT - SUBJECTIVE AND OBJECTIVE BOX
[place] [facility], THE HEART CENTER                                   [address] [place] [address]                                                      PHONE: [phone]                                                         FAX: [phone]  [email or web address]  ---------------------------------------------------------------------------------------------------------------------------------    HPI:  ANNY BELLA is an 82y Male PMHX HTN, HLD, Afib on Xarelto, who presented to [facility] ED complaining of abd pain.  Pt was admitted with urinary retention, encephalopathy, and urosepsis.  He required ICU care for pressors and mechanical ventilation.  Pt underwent surgical placement of a malcolm catheter.  Pt had an echo performed that demonstrated an Ef 30-35%, with severe MR.      PAST MEDICAL & SURGICAL HISTORY:  HTN (hypertension)  Afib  No significant past surgical history: could not assess, pt altered      No Known Allergies      MEDICATIONS  (STANDING):  ascorbic acid IVPB 1500 milliGRAM(s) IV Intermittent every 6 hours  cefepime  IVPB 1000 milliGRAM(s) IV Intermittent every 12 hours  finasteride 5 milliGRAM(s) Oral daily  heparin  Injectable 5000 Unit(s) SubCutaneous every 8 hours  hydrocortisone sodium succinate Injectable 50 milliGRAM(s) IV Push every 6 hours  polyethylene glycol 3350 17 Gram(s) Oral daily  thiamine IVPB 200 milliGRAM(s) IV Intermittent every 12 hours    MEDICATIONS  (PRN):  acetaminophen   Tablet 650 milliGRAM(s) Oral every 6 hours PRN For Temp greater than 38 C (100.4 F)  artificial  tears Solution 2 Drop(s) Both EYES every 4 hours PRN Dry Eyes  benzocaine 15 mG/menthol 3.6 mG Lozenge 1 Lozenge Oral five times a day PRN Sore Throat  naphazoline/pheniramine Solution 1 Drop(s) Both EYES four times a day PRN burning      Family History: Pt denies hx of early cad, SCD, or congenital heart disease.      Social History:  Cigarettes:former                    Alchohol:       no          Illicit Drug Abuse:  no    ROS:  Constitutional: No fever, weight loss or fatigue  Eyes: No eye pain, visual disturbances, or discharge  ENMT:  No difficulty hearing, tinnitus, vertigo; No sinus or throat pain  Neck: No pain or stiffness  Respiratory: No cough, wheezing, chills or hemoptysis  Cardiovascular: No chest pain, palpitations, shortness of breath, dizziness or leg swelling  Gastrointestinal: No abdominal or epigastric pain. No nausea, vomiting or hematemesis; No diarrhea or constipation. No melena or hematochezia.  Genitourinary: No dysuria, frequency, hematuria or incontinence  Rectal: No pain, hemorrhoids or incontinence  Neurological: No headaches, memory loss, loss of strength, numbness or tremors  Skin: No itching, burning, rashes or lesions   Lymph Nodes: No enlarged glands  Endocrine: No heat or cold intolerance; No hair loss  Musculoskeletal: No joint pain or swelling; No muscle, back or extremity pain  Psychiatric: No depression, anxiety, mood swings or difficulty sleeping  Heme/Lymph: No easy bruising or bleeding gums  Allergy and Immunologic: No hives or eczema    Vital Signs Last 24 Hrs  T(C): 36.6 (06 Jan 2018 23:15), Max: 36.9 (06 Jan 2018 15:55)  T(F): 97.8 (06 Jan 2018 23:15), Max: 98.4 (06 Jan 2018 15:55)  HR: 65 (07 Jan 2018 06:22) (65 - 106)  BP: 133/88 (07 Jan 2018 06:22) (109/76 - 133/88)  BP(mean): 91 (06 Jan 2018 11:00) (91 - 91)  RR: 22 (06 Jan 2018 23:15) (17 - 30)  SpO2: 95% (06 Jan 2018 23:15) (89% - 97%)  ICU Vital Signs Last 24 Hrs  ANNY BELLA  I&O's Detail    06 Jan 2018 07:01  -  07 Jan 2018 07:00  --------------------------------------------------------  IN:    Solution: 100 mL    Solution: 200 mL    Solution: 100 mL  Total IN: 400 mL    OUT:    Indwelling Catheter - Urethral: 2575 mL  Total OUT: 2575 mL    Total NET: -2175 mL        I&O's Summary    06 Jan 2018 07:01  -  07 Jan 2018 07:00  --------------------------------------------------------  IN: 400 mL / OUT: 2575 mL / NET: -2175 mL      Drug Dosing Weight  ANNY BELLA      PHYSICAL EXAM:  General: Appears well developed, well nourished alert and cooperative.  HEENT: Head; normocephalic, atraumatic.  Eyes: Pupils reactive, cornea wnl.  Neck: Supple, no nodes adenopathy, no NVD or carotid bruit or thyromegaly.  CARDIOVASCULAR: Normal S1 and S2, No murmur, rub, gallop or lift.   LUNGS: No rales, rhonchi or wheeze. Normal breath sounds bilaterally.  ABDOMEN: Soft, nontender without mass or organomegaly. bowel sounds normoactive.  EXTREMITIES: No clubbing, cyanosis or edema. Distal pulses wnl.   SKIN: warm and dry with normal turgor.  NEURO: Alert/oriented x 3/normal motor exam. No pathologic reflexes.    PSYCH: normal affect.        LABS:                        12.3   34.1  )-----------( 162      ( 06 Jan 2018 07:32 )             37.2     01-06    139  |  102  |  28.0<H>  ----------------------------<  92  3.6   |  24.0  |  0.93    Ca    8.0<L>      06 Jan 2018 07:32  Phos  3.1     01-06  Mg     1.9     01-06      ANNY BELLA            RADIOLOGY & ADDITIONAL STUDIES:    INTERPRETATION OF TELEMETRY (personally reviewed):    ECG:Afib, no st elevations/depressions    ECHO:1/5/2017     Summary:   1. Technically adequate study.   2. Severely decreased global left ventricular systolic function.   3. Left ventricular ejection fraction, by visual estimation, is 30 to   35%.   4. Multiple left ventricular regional wall motion abnormalities exist.   See wall motion findings.   5. There is moderate eccentric left ventricular hypertrophy.   6. Moderately enlarged right ventricle.   7. Severely reduced RV systolic function.   8. Severely enlarged left atrium.   9. Severely dilated right atrium.  10. Mild mitral annular calcification.  11. Thickening of the anterior and posterior mitral valve leaflets.  12. Moderate to severe mitral valve regurgitation.  13. Sclerotic aortic valve with normal opening.  14. Moderate aortic regurgitation.  15. Moderate tricuspid regurgitation. Normal estimated PASP, probably   underestimated due to JENNIFER.  16. Trivial pericardial effusion.       Assessment and Plan:  In summary, ANNY BELLA is an 82y Male with past medical history significant for       -in review of prior files in EPIC pt has no documented hx of HFrEF but has had hx of LE edema        Thank you for allowing [facility] to participate in the care of this patient.  Please feel free to call with any questions or concerns. [place] [facility], [facility]                                   [address] [address]                                                      PHONE: [phone]                                                         FAX: [phone]  [email or web address]  ---------------------------------------------------------------------------------------------------------------------------------    HPI:  ANNY BELLA is an 82y Male PMHX HTN, HLD, Afib on Xarelto, /sp unsuccessful ablation at [facility], who presented to [facility] ED complaining of abd pain.  Pt was admitted with urinary retention, encephalopathy, and urosepsis.  He required ICU care for pressors and mechanical ventilation.  Pt underwent surgical placement of a malcolm catheter.  Pt had an echo performed that demonstrated an Ef 30-35%, with severe MR.  Gre[place] with no prior Hx of CHF.  Pt denies cp, palps, but does mention mild sob.     PAST MEDICAL & SURGICAL HISTORY:  HTN (hypertension)  Afib  No significant past surgical history: could not assess, pt altered      No Known Allergies      MEDICATIONS  (STANDING):  ascorbic acid IVPB 1500 milliGRAM(s) IV Intermittent every 6 hours  cefepime  IVPB 1000 milliGRAM(s) IV Intermittent every 12 hours  finasteride 5 milliGRAM(s) Oral daily  heparin  Injectable 5000 Unit(s) SubCutaneous every 8 hours  hydrocortisone sodium succinate Injectable 50 milliGRAM(s) IV Push every 6 hours  polyethylene glycol 3350 17 Gram(s) Oral daily  thiamine IVPB 200 milliGRAM(s) IV Intermittent every 12 hours    MEDICATIONS  (PRN):  acetaminophen   Tablet 650 milliGRAM(s) Oral every 6 hours PRN For Temp greater than 38 C (100.4 F)  artificial  tears Solution 2 Drop(s) Both EYES every 4 hours PRN Dry Eyes  benzocaine 15 mG/menthol 3.6 mG Lozenge 1 Lozenge Oral five times a day PRN Sore Throat  naphazoline/pheniramine Solution 1 Drop(s) Both EYES four times a day PRN burning      Family History: Pt denies hx of early cad, SCD, or congenital heart disease.      Social History:  Cigarettes:former                    Alchohol:       no          Illicit Drug Abuse:  no    ROS:  Constitutional: No fever, weight loss or fatigue  Eyes: No eye pain, visual disturbances, or discharge  ENMT:  No difficulty hearing, tinnitus, vertigo; No sinus or throat pain  Neck: No pain or stiffness  Respiratory: No cough, wheezing, chills or hemoptysis  Cardiovascular: No chest pain, palpitations, shortness of breath, dizziness or leg swelling  Gastrointestinal: No abdominal or epigastric pain. No nausea, vomiting or hematemesis; No diarrhea or constipation. No melena or hematochezia.  Genitourinary: No dysuria, frequency, hematuria or incontinence  Rectal: No pain, hemorrhoids or incontinence  Neurological: No headaches, memory loss, loss of strength, numbness or tremors  Skin: No itching, burning, rashes or lesions   Lymph Nodes: No enlarged glands  Endocrine: No heat or cold intolerance; No hair loss  Musculoskeletal: No joint pain or swelling; No muscle, back or extremity pain  Psychiatric: No depression, anxiety, mood swings or difficulty sleeping  Heme/Lymph: No easy bruising or bleeding gums  Allergy and Immunologic: No hives or eczema    Vital Signs Last 24 Hrs  T(C): 36.6 (06 Jan 2018 23:15), Max: 36.9 (06 Jan 2018 15:55)  T(F): 97.8 (06 Jan 2018 23:15), Max: 98.4 (06 Jan 2018 15:55)  HR: 65 (07 Jan 2018 06:22) (65 - 106)  BP: 133/88 (07 Jan 2018 06:22) (109/76 - 133/88)  BP(mean): 91 (06 Jan 2018 11:00) (91 - 91)  RR: 22 (06 Jan 2018 23:15) (17 - 30)  SpO2: 95% (06 Jan 2018 23:15) (89% - 97%)  ICU Vital Signs Last 24 Hrs  ANNY BELLA  I&O's Detail    06 Jan 2018 07:01  -  07 Jan 2018 07:00  --------------------------------------------------------  IN:    Solution: 100 mL    Solution: 200 mL    Solution: 100 mL  Total IN: 400 mL    OUT:    Indwelling Catheter - Urethral: 2575 mL  Total OUT: 2575 mL    Total NET: -2175 mL        I&O's Summary    06 Jan 2018 07:01  -  07 Jan 2018 07:00  --------------------------------------------------------  IN: 400 mL / OUT: 2575 mL / NET: -2175 mL      Drug Dosing Weight  ANNY BELLA      PHYSICAL EXAM:  General: Appears well developed, well nourished alert and cooperative.  HEENT: Head; normocephalic, atraumatic.  Eyes: Pupils reactive, cornea wnl.  Neck: Supple, no nodes adenopathy, no NVD or carotid bruit or thyromegaly.  CARDIOVASCULAR: Normal S1 and S2, No murmur, rub, gallop or lift.   LUNGS: No rales, rhonchi or wheeze. Normal breath sounds bilaterally.  ABDOMEN: Soft, nontender without mass or organomegaly. bowel sounds normoactive.  EXTREMITIES: No clubbing, cyanosis or edema. Distal pulses wnl.   SKIN: warm and dry with normal turgor.  NEURO: Alert/oriented x 3/normal motor exam. No pathologic reflexes.    PSYCH: normal affect.        LABS:                        12.3   34.1  )-----------( 162      ( 06 Jan 2018 07:32 )             37.2     01-06    139  |  102  |  28.0<H>  ----------------------------<  92  3.6   |  24.0  |  0.93    Ca    8.0<L>      06 Jan 2018 07:32  Phos  3.1     01-06  Mg     1.9     01-06      ANNY BELLA            RADIOLOGY & ADDITIONAL STUDIES:    INTERPRETATION OF TELEMETRY (personally reviewed):    ECG:Afib, no st elevations/depressions    ECHO:1/5/2017     Summary:   1. Technically adequate study.   2. Severely decreased global left ventricular systolic function.   3. Left ventricular ejection fraction, by visual estimation, is 30 to   35%.   4. Multiple left ventricular regional wall motion abnormalities exist.   See wall motion findings.   5. There is moderate eccentric left ventricular hypertrophy.   6. Moderately enlarged right ventricle.   7. Severely reduced RV systolic function.   8. Severely enlarged left atrium.   9. Severely dilated right atrium.  10. Mild mitral annular calcification.  11. Thickening of the anterior and posterior mitral valve leaflets.  12. Moderate to severe mitral valve regurgitation.  13. Sclerotic aortic valve with normal opening.  14. Moderate aortic regurgitation.  15. Moderate tricuspid regurgitation. Normal estimated PASP, probably   underestimated due to JENNIFER.  16. Trivial pericardial effusion.       Assessment and Plan:  In summary, ANNY BELLA is an 82y Male PMHX HTN, HLD, Afib on Xarelto, /sp unsuccessful ablation at [facility], who presented to [facility] ED complaining of abd pain.  Pt was admitted with urinary retention, encephalopathy, and urosepsis.  He required ICU care for pressors and mechanical ventilation.  Pt underwent surgical placement of a malcolm catheter.  Pt had an echo performed that demonstrated an Ef 30-35%, with severe MR.  Pt with no prior Hx of CHF.  Pt denies cp, palps, but does mention mild sob.     New onset HFrEF:  -will repeat echo tomorrow   -etiology - ? secondary to septic shock vs takotsubos from spouse passing away 3 weeks ago, vs cad, vs afib  -will need ischemic work up early this week  -pt has requested that Dr Maharaj be notified tomorrow after echo performed.  -started atrovastatin 40 mg  -started lasix 20 mg IV BID  -started spironolactone 12.5 mg   -started lisinopril 2.5 mg daily  -will start coreg once pt out of mild decompensated hf     -pt needs nutrition eval given severe malnutrition     Thank you for allowing [facility] to participate in the care of this patient.  Please feel free to call with any questions or concerns.

## 2018-01-07 NOTE — CONSULT NOTE ADULT - PROBLEM SELECTOR RECOMMENDATION 9
patient to be brought to OR for cysto clot evacuation.  son at bedside and consented.
- likely related to prostatitis and prostate injury which disseminated infection  - continue vasopressor support with Levophed, will titrate to maintain a MAP > 65, currently requiring escalating doses  - fixed dose vasopressin added for additional BP support  - lactate now downtrendin --> 5 --> 3, will continue to trend   - empiric antibiotic therapy with zosyn and vancomycin  - f/u pancultures and adjust antibiotic regimen in accordance with results
- DE ESCALATE ANTIBIOTICS TO CEFTRIAXONE  - FOLLOW UP ON ALL OUTSTANDING URINE CULTURE  TREND WBC COUNT

## 2018-01-07 NOTE — PROGRESS NOTE ADULT - SUBJECTIVE AND OBJECTIVE BOX
HOSPITALIST PROGRESS NOTE    ANNY BELLA  [id number]  82yMale    Patient is a 82y old  Male who presents with a chief complaint of urinary retention (04 Jan 2018 16:51)      SUBJECTIVE:   Chart reviewed since last visit.  Patient seen and examined at bedside for urinary retention, CMP.  Berry remains, denies any nausea, vomiting or abdominal pain.  Diarrhea since yesterday - mushy stools after every meal; attributes to laxative  Denies any dizziness, headaches, fever or chills. Feels his visual acuity is decreased.  Eye burning has improved with eye drops  Denies any chest pain, palpitations, cough - feels mild dyspnea over past 2 days, hasn't had his Breo for COPD      OBJECTIVE:  Vital Signs Last 24 Hrs  T(C): 36.8 (07 Jan 2018 07:00), Max: 36.9 (06 Jan 2018 15:55)  T(F): 98.2 (07 Jan 2018 07:00), Max: 98.4 (06 Jan 2018 15:55)  HR: 62 (07 Jan 2018 07:00) (62 - 106)  BP: 118/65 (07 Jan 2018 07:00) (109/76 - 133/88)  BP(mean): 91 (06 Jan 2018 11:00) (91 - 91)  RR: 18 (07 Jan 2018 07:00) (18 - 22)  SpO2: 90% (07 Jan 2018 07:00) (90% - 97%)    PHYSICAL EXAMINATION  General: NAD[+]   nontoxic[+]   Elderly male sitting up in chair  HEENT: AT/NC[+]  PERRLA[+]  EOMI[+]  Arcus senilis[+]  NECK: Supple[+]  JVD[] Carotid bruit[]  CVS: RRR[+]  Irregular[]  S1+S2[+]   Murmur[]  RESP: Fair air entry bilaterally[+]   Clear sounds[]   poor effort []  wheeze[-]   Crackles[-]  GI: Soft[+]  Nondistended[+]   Nontender[+]   Bowel Sounds[+]  Mass[]   HSM[]   Ascites[]  : suprapubic tenderness[-]   CVA Tenderness[-]   Berry[+]  MS: FROM[+]   Edema[-]  CNS: AAOx3[+]   grossly intact  INTEG: Skin is Warm[+]  dry[] Lesion[] Decubitus[]  PSYCH: Fair Mood, congruent Affect       I&O's Summary    06 Jan 2018 07:01  -  07 Jan 2018 07:00  --------------------------------------------------------  IN: 400 mL / OUT: 2575 mL / NET: -2175 mL                            12.6   28.2  )-----------( 167      ( 07 Jan 2018 08:53 )             38.3       01-07    146<H>  |  106  |  31.0<H>  ----------------------------<  90  3.6   |  27.0  |  0.81    Ca    8.4<L>      07 Jan 2018 08:53  Phos  3.1     01-06  Mg     1.9     01-06    TPro  5.8<L>  /  Alb  2.6<L>  /  TBili  0.5  /  DBili  x   /  AST  29  /  ALT  14  /  AlkPhos  79  01-07            Culture:  Culture - Urine (01.05.18 @ 00:42)    Specimen Source: .Urine Catheterized    Culture Results:   10,000 - 49,000 CFU/mL Gram Negative Rods Identification and  susceptibility to follow.  Culture in progress         MEDICATIONS  (STANDING):  ascorbic acid IVPB 1500 milliGRAM(s) IV Intermittent every 6 hours  atorvastatin 40 milliGRAM(s) Oral at bedtime  cefepime  IVPB 1000 milliGRAM(s) IV Intermittent every 12 hours  finasteride 5 milliGRAM(s) Oral daily  furosemide   Injectable 20 milliGRAM(s) IV Push two times a day  heparin  Injectable 5000 Unit(s) SubCutaneous every 8 hours  hydrocortisone sodium succinate Injectable 50 milliGRAM(s) IV Push every 6 hours  lisinopril 2.5 milliGRAM(s) Oral daily  polyethylene glycol 3350 17 Gram(s) Oral daily  spironolactone 12.5 milliGRAM(s) Oral daily  thiamine IVPB 200 milliGRAM(s) IV Intermittent every 12 hours      MEDICATIONS  (PRN):  acetaminophen   Tablet 650 milliGRAM(s) Oral every 6 hours PRN For Temp greater than 38 C (100.4 F)  artificial  tears Solution 2 Drop(s) Both EYES every 4 hours PRN Dry Eyes  benzocaine 15 mG/menthol 3.6 mG Lozenge 1 Lozenge Oral five times a day PRN Sore Throat  naphazoline/pheniramine Solution 1 Drop(s) Both EYES four times a day PRN burning

## 2018-01-07 NOTE — PROGRESS NOTE ADULT - ATTENDING COMMENTS
Hypernatremia - monitor SNa  Diarrhea - discontinue laxatives, Add probiotics  COPD - Duonebs PRN, resume home Breo once able to obtain

## 2018-01-07 NOTE — CONSULT NOTE ADULT - PROBLEM SELECTOR RECOMMENDATION 4
MAINTAIN MILES  UROLOGY FOLLOWING
- likely multifactorial- septic shock, lactic acidosis, uremia  - serum bicarb trending down, currently 14  - bicarb infusion started  - will trend BMP

## 2018-01-07 NOTE — CONSULT NOTE ADULT - SUBJECTIVE AND OBJECTIVE BOX
[facility] INFECTIOUS DISEASES AND INTERNAL MEDICINE at [place]  =======================================================  Nitish Camarillo MD  Diplomates American Board of Internal Medicine and Infectious Diseases  =======================================================    [id number]  ANNY BELLA is a 82y  Male     =======================================================  Past Medical & Surgical Hx:  =====================  PAST MEDICAL & SURGICAL HISTORY:  HTN (hypertension)  Afib  No significant past surgical history: could not assess, pt altered      Problem List:  ==========  HEALTH ISSUES - PROBLEM Dx:  Prostatitis, unspecified prostatitis type: Prostatitis, unspecified prostatitis type  Prophylactic measure: Prophylactic measure  Essential hypertension: Essential hypertension  Chronic atrial fibrillation: Chronic atrial fibrillation  Other cardiomyopathy: Other cardiomyopathy  Urinary tract infection with hematuria, site unspecified: Urinary tract infection with hematuria, site unspecified  Afib: Afib  Prostatitis: Prostatitis  HTN (hypertension): HTN (hypertension)  Atrial fibrillation: Atrial fibrillation  HARPREET (acute kidney injury): HARPREET (acute kidney injury)  Altered mental status: Altered mental status  Metabolic acidosis: Metabolic acidosis  Acute respiratory failure with hypoxia: Acute respiratory failure with hypoxia  Septic shock: Septic shock  Acute urinary retention: Acute urinary retention          Social Hx:  =======  no toxic habits currently      FAMILY HISTORY:  No pertinent family history: could not assess, pt altered      Allergies    No Known Allergies    Intolerances        MEDICATIONS  (STANDING):  ascorbic acid IVPB 1500 milliGRAM(s) IV Intermittent every 6 hours  atorvastatin 40 milliGRAM(s) Oral at bedtime  finasteride 5 milliGRAM(s) Oral daily  furosemide   Injectable 20 milliGRAM(s) IV Push two times a day  heparin  Injectable 5000 Unit(s) SubCutaneous every 8 hours  hydrocortisone sodium succinate Injectable 50 milliGRAM(s) IV Push every 6 hours  lisinopril 2.5 milliGRAM(s) Oral daily  saccharomyces boulardii 250 milliGRAM(s) Oral two times a day  spironolactone 12.5 milliGRAM(s) Oral daily  thiamine IVPB 200 milliGRAM(s) IV Intermittent every 12 hours    MEDICATIONS  (PRN):  acetaminophen   Tablet 650 milliGRAM(s) Oral every 6 hours PRN For Temp greater than 38 C (100.4 F)  ALBUTerol/ipratropium for Nebulization 3 milliLiter(s) Nebulizer every 6 hours PRN Shortness of Breath  artificial  tears Solution 2 Drop(s) Both EYES every 4 hours PRN Dry Eyes  benzocaine 15 mG/menthol 3.6 mG Lozenge 1 Lozenge Oral five times a day PRN Sore Throat  naphazoline/pheniramine Solution 1 Drop(s) Both EYES four times a day PRN burning        =======================================================  REVIEW OF SYSTEMS:  Constitutional: No fever, No chills, No sweats.  Eye: No icterus, No double vision.  Ear/Nose/Mouth/Throat: No nasal congestion, No sore throat.  Respiratory: No shortness of breath, No cough, No sputum production, No wheezing.  Cardiovascular: No chest pain, No palpitations, No syncope.  Gastrointestinal: No nausea, No vomiting, No diarrhea, No abdominal pain.  Genitourinary: No dysuria, No hematuria, No change in urine stream.  Hematology/Lymphatics: No bleeding tendency.  Endocrine: No excessive thirst, No polyuria.  Immunologic: No malaise.  Musculoskeletal: No back pain, No neck pain, No joint pain, No muscle pain.  Integumentary: No rash, No pruritus, No skin lesion.  Neurologic: No numbness, No tingling, No headache.  Psychiatric: No depression.    =======================================================  I&O's Detail    06 Jan 2018 07:01  -  07 Jan 2018 07:00  --------------------------------------------------------  IN:    Solution: 100 mL    Solution: 200 mL    Solution: 100 mL  Total IN: 400 mL    OUT:    Indwelling Catheter - Urethral: 2575 mL  Total OUT: 2575 mL    Total NET: -2175 mL      07 Jan 2018 07:01  -  07 Jan 2018 16:32  --------------------------------------------------------  IN:  Total IN: 0 mL    OUT:    Indwelling Catheter - Urethral: 650 mL  Total OUT: 650 mL    Total NET: -650 mL          Physical Exam:  ============  Vital Signs Last 24 Hrs  T(C): 36.8 (07 Jan 2018 07:00), Max: 36.8 (07 Jan 2018 07:00)  T(F): 98.2 (07 Jan 2018 07:00), Max: 98.2 (07 Jan 2018 07:00)  HR: 62 (07 Jan 2018 07:00) (62 - 106)  BP: 118/65 (07 Jan 2018 07:00) (109/76 - 133/88)  BP(mean): --  RR: 18 (07 Jan 2018 07:00) (18 - 22)  SpO2: 90% (07 Jan 2018 07:00) (90% - 95%)    General: Alert and oriented, No acute distress.  Eye: Pupils are equal, round and reactive to light, Extraocular movements are intact, Normal conjunctiva.  HENT: Normocephalic, Oral mucosa is moist, No pharyngeal erythema, No sinus tenderness.  Neck: Supple, No lymphadenopathy.  Respiratory: Lungs are clear to auscultation, Respirations are non-labored.  Cardiovascular: Normal rate, Regular rhythm, No murmur, Good pulses equal in all extremities, No edema.  Gastrointestinal: Soft, Non-tender, Non-distended, Normal bowel sounds.  Genitourinary: No costovertebral angle tenderness.  Lymphatics: No lymphadenopathy neck, axilla, groin.  Musculoskeletal: Normal range of motion, Normal strength.  Integumentary: No rash.  Neurologic: Alert, Oriented, No focal deficits, Cranial Nerves II-XII are grossly intact.  Psychiatric: Appropriate mood & affect.      =======================================================  Labs:  ====    Labs:  01-07    146<H>  |  106  |  31.0<H>  ----------------------------<  90  3.6   |  27.0  |  0.81    Ca    8.4<L>      07 Jan 2018 08:53  Phos  3.1     01-06  Mg     1.9     01-06    TPro  5.8<L>  /  Alb  2.6<L>  /  TBili  0.5  /  DBili  x   /  AST  29  /  ALT  14  /  AlkPhos  79  01-07                          12.6   28.2  )-----------( 167      ( 07 Jan 2018 08:53 )             38.3           LIVER FUNCTIONS - ( 07 Jan 2018 08:53 )  Alb: 2.6 g/dL / Pro: 5.8 g/dL / ALK PHOS: 79 U/L / ALT: 14 U/L / AST: 29 U/L / GGT: x               CAPILLARY BLOOD GLUCOSE            RECENT CULTURES:  01-05 @ 00:42 .Urine Catheterized Enterobacter cloacae    10,000 - 49,000 CFU/mL Enterobacter cloacae        01-04 @ 18:22 .Blood Blood-Peripheral     No growth at 48 hours        01-04 @ 18:20 .Blood Blood-Peripheral     No growth at 48 hours [facility] DISE[facility]ES [facility] INTERNAL MEDICINE at [place]  =======================================================  Nitish Aleman MD [facility] CenterSURAJ Camarillo MD  Diplomates American Board of Internal Medicine and Infectious Diseases  =======================================================    [id number]  ANYN BELLA is a 82y  Male   this 82 y.o. man with HLD, Afib on Xarelto, /sp unsuccessful ablation at [facility], who  was admitted 1/4/18 for workup of complaining of abd pain found with urinary retention x 2 weeks, encephalopathy, and UTI sepsis. He had 4 to 5 mo hx of nocturia, 6-7x each evening. He has been regularly following Urology Dr. Mark Schumer for the past 5-6 mo and was worked up for retention. Miles catheter was placed at that time, by his report his prostate was reportedly normal during the prior examinations in the office. Since removing the catheter 2wks ago symptoms have recurred.   He had multiple attempts at placement of miles and eventually success after 5 attempts. He required ICU care for pressors and mechanical ventilation.     we are now consulted to further evaluate this patient.  He has a miles in place.  Urine cultures obtained from hospital course is growing enterobacter cloacae with 10k to 49k CFU.   patient still has some suprapubic pain.  patient is without fever    =======================================================  Past Medical & Surgical Hx:  =====================  PAST MEDICAL & SURGICAL HISTORY:  HTN (hypertension)  Afib  No significant past surgical history: could not assess, pt altered      Problem List:  ==========  HEALTH ISSUES - PROBLEM Dx:  Prostatitis, unspecified prostatitis type: Prostatitis, unspecified prostatitis type  Prophylactic measure: Prophylactic measure  Essential hypertension: Essential hypertension  Chronic atrial fibrillation: Chronic atrial fibrillation  Other cardiomyopathy: Other cardiomyopathy  Urinary tract infection with hematuria, site unspecified: Urinary tract infection with hematuria, site unspecified  Afib: Afib  Prostatitis: Prostatitis  HTN (hypertension): HTN (hypertension)  Atrial fibrillation: Atrial fibrillation  HARPREET (acute kidney injury): HARPREET (acute kidney injury)  Altered mental status: Altered mental status  Metabolic acidosis: Metabolic acidosis  Acute respiratory failure with hypoxia: Acute respiratory failure with hypoxia  Septic shock: Septic shock  Acute urinary retention: Acute urinary retention          Social Hx:  =======  no toxic habits currently      FAMILY HISTORY:  No pertinent family history: could not assess, pt altered      Allergies    No Known Allergies    Intolerances    MEDICATIONS  (STANDING):  ascorbic acid IVPB 1500 milliGRAM(s) IV Intermittent every 6 hours  atorvastatin 40 milliGRAM(s) Oral at bedtime  cefTRIAXone   IVPB      finasteride 5 milliGRAM(s) Oral daily  furosemide   Injectable 20 milliGRAM(s) IV Push two times a day  heparin  Injectable 5000 Unit(s) SubCutaneous every 8 hours  hydrocortisone sodium succinate Injectable 50 milliGRAM(s) IV Push every 6 hours  lisinopril 2.5 milliGRAM(s) Oral daily  saccharomyces boulardii 250 milliGRAM(s) Oral two times a day  spironolactone 12.5 milliGRAM(s) Oral daily  thiamine IVPB 200 milliGRAM(s) IV Intermittent every 12 hours    MEDICATIONS  (PRN):  acetaminophen   Tablet 650 milliGRAM(s) Oral every 6 hours PRN For Temp greater than 38 C (100.4 F)  ALBUTerol/ipratropium for Nebulization 3 milliLiter(s) Nebulizer every 6 hours PRN Shortness of Breath  artificial  tears Solution 2 Drop(s) Both EYES every 4 hours PRN Dry Eyes  benzocaine 15 mG/menthol 3.6 mG Lozenge 1 Lozenge Oral five times a day PRN Sore Throat  naphazoline/pheniramine Solution 1 Drop(s) Both EYES four times a day PRN burning        =======================================================  REVIEW OF SYSTEMS:  Constitutional: No fever, No chills, No sweats.  Eye: No icterus, No double vision.  Ear/Nose/Mouth/Throat: No nasal congestion, No sore throat.  Respiratory: No shortness of breath, No cough, No sputum production, No wheezing.  Cardiovascular: No chest pain, No palpitations, No syncope.  Gastrointestinal: No nausea, No vomiting, No diarrhea, No abdominal pain.  Genitourinary: No dysuria, No hematuria, No change in urine stream.  Hematology/Lymphatics: No bleeding tendency.  Endocrine: No excessive thirst, No polyuria.  Immunologic: No malaise.  Musculoskeletal: No back pain, No neck pain, No joint pain, No muscle pain.  Integumentary: No rash, No pruritus, No skin lesion.  Neurologic: No numbness, No tingling, No headache.  Psychiatric: No depression.    =======================================================  I&O's Detail    06 Jan 2018 07:01  -  07 Jan 2018 07:00  --------------------------------------------------------  IN:    Solution: 100 mL    Solution: 200 mL    Solution: 100 mL  Total IN: 400 mL    OUT:    Indwelling Catheter - Urethral: 2575 mL  Total OUT: 2575 mL    Total NET: -2175 mL      07 Jan 2018 07:01  -  07 Jan 2018 16:32  --------------------------------------------------------  IN:  Total IN: 0 mL    OUT:    Indwelling Catheter - Urethral: 650 mL  Total OUT: 650 mL    Total NET: -650 mL          Physical Exam:  ============  Vital Signs Last 24 Hrs  T(C): 36.8 (07 Jan 2018 07:00), Max: 36.8 (07 Jan 2018 07:00)  T(F): 98.2 (07 Jan 2018 07:00), Max: 98.2 (07 Jan 2018 07:00)  HR: 62 (07 Jan 2018 07:00) (62 - 106)  BP: 118/65 (07 Jan 2018 07:00) (109/76 - 133/88)  BP(mean): --  RR: 18 (07 Jan 2018 07:00) (18 - 22)  SpO2: 90% (07 Jan 2018 07:00) (90% - 95%)    General: Alert and oriented, No acute distress.  Eye: Pupils are equal, round and reactive to light, Extraocular movements are intact, Normal conjunctiva.  HENT: Normocephalic, Oral mucosa is moist,    Neck: Supple, No lymphadenopathy.  Respiratory: Lungs are clear to auscultation, Respirations are non-labored.  Cardiovascular: Normal rate, Regular rhythm, No murmur, Good pulses equal in all extremities, No edema.  Gastrointestinal: Soft, Non-tender, Non-distended, Normal bowel sounds.  Genitourinary: mild suprapubic tenderness.  MILES IN PLACE WITH CLOTS AND SEDIMENT  Lymphatics: No lymphadenopathy neck, axilla, groin.  Musculoskeletal: Normal range of motion, Normal strength.  Integumentary: No rash.  Neurologic: Alert, Oriented, No focal deficits, Cranial Nerves II-XII are grossly intact.  Psychiatric: Appropriate mood & affect.      =======================================================  Labs:  ====    Labs:  01-07    146<H>  |  106  |  31.0<H>  ----------------------------<  90  3.6   |  27.0  |  0.81    Ca    8.4<L>      07 Jan 2018 08:53  Phos  3.1     01-06  Mg     1.9     01-06    TPro  5.8<L>  /  Alb  2.6<L>  /  TBili  0.5  /  DBili  x   /  AST  29  /  ALT  14  /  AlkPhos  79  01-07                          12.6   28.2  )-----------( 167      ( 07 Jan 2018 08:53 )             38.3           LIVER FUNCTIONS - ( 07 Jan 2018 08:53 )  Alb: 2.6 g/dL / Pro: 5.8 g/dL / ALK PHOS: 79 U/L / ALT: 14 U/L / AST: 29 U/L / GGT: x                    RECENT CULTURES:  01-05 @ 00:42 .Urine Catheterized Enterobacter cloacae    10,000 - 49,000 CFU/mL Enterobacter cloacae  Culture - Urine (01.05.18 @ 00:42)    -  Amikacin: S <=16    -  Ampicillin: R <=8    -  Ampicillin/Sulbactam: R <=8/4    -  Aztreonam: S <=4    -  Cefazolin: R >16    -  Cefepime: S <=4    -  Cefoxitin: R >16    -  Ceftazidime: S 8    -  Ceftriaxone: S <=1    -  Ciprofloxacin: S <=1    -  Ertapenem: S <=1    -  Gentamicin: S <=4    -  Imipenem: S <=1    -  Levofloxacin: S <=2    -  Meropenem: S <=1    -  Nitrofurantoin: I 64    -  Piperacillin/Tazobactam: S <=16    -  Tobramycin: S <=4    -  Trimethoprim/Sulfamethoxazole: S <=2/38    Specimen Source: .Urine Catheterized    Culture Results:   10,000 - 49,000 CFU/mL Enterobacter cloacae    Organism Identification: Enterobacter cloacae    Organism: Enterobacter cloacae    Method Type: PETRONA          01-04 @ 18:22 .Blood Blood-Peripheral     No growth at 48 hours        01-04 @ 18:20 .Blood Blood-Peripheral     No growth at 48 hours

## 2018-01-08 DIAGNOSIS — R31.9 HEMATURIA, UNSPECIFIED: ICD-10-CM

## 2018-01-08 LAB
ANION GAP SERPL CALC-SCNC: 15 MMOL/L — SIGNIFICANT CHANGE UP (ref 5–17)
BUN SERPL-MCNC: 32 MG/DL — HIGH (ref 8–20)
CALCIUM SERPL-MCNC: 8.3 MG/DL — LOW (ref 8.6–10.2)
CHLORIDE SERPL-SCNC: 105 MMOL/L — SIGNIFICANT CHANGE UP (ref 98–107)
CO2 SERPL-SCNC: 26 MMOL/L — SIGNIFICANT CHANGE UP (ref 22–29)
CREAT SERPL-MCNC: 0.71 MG/DL — SIGNIFICANT CHANGE UP (ref 0.5–1.3)
GLUCOSE SERPL-MCNC: 104 MG/DL — SIGNIFICANT CHANGE UP (ref 70–115)
HCT VFR BLD CALC: 39.2 % — LOW (ref 42–52)
HGB BLD-MCNC: 13.2 G/DL — LOW (ref 14–18)
MCHC RBC-ENTMCNC: 31.5 PG — HIGH (ref 27–31)
MCHC RBC-ENTMCNC: 33.7 G/DL — SIGNIFICANT CHANGE UP (ref 32–36)
MCV RBC AUTO: 93.6 FL — SIGNIFICANT CHANGE UP (ref 80–94)
PLATELET # BLD AUTO: 179 K/UL — SIGNIFICANT CHANGE UP (ref 150–400)
POTASSIUM SERPL-MCNC: 3 MMOL/L — LOW (ref 3.5–5.3)
POTASSIUM SERPL-SCNC: 3 MMOL/L — LOW (ref 3.5–5.3)
RBC # BLD: 4.19 M/UL — LOW (ref 4.6–6.2)
RBC # FLD: 13.9 % — SIGNIFICANT CHANGE UP (ref 11–15.6)
SODIUM SERPL-SCNC: 146 MMOL/L — HIGH (ref 135–145)
WBC # BLD: 18.2 K/UL — HIGH (ref 4.8–10.8)
WBC # FLD AUTO: 18.2 K/UL — HIGH (ref 4.8–10.8)

## 2018-01-08 PROCEDURE — 99233 SBSQ HOSP IP/OBS HIGH 50: CPT

## 2018-01-08 PROCEDURE — 93308 TTE F-UP OR LMTD: CPT | Mod: 26

## 2018-01-08 RX ORDER — FLUTICASONE FUROATE AND VILANTEROL TRIFENATATE 100; 25 UG/1; UG/1
1 POWDER RESPIRATORY (INHALATION) DAILY
Qty: 0 | Refills: 0 | Status: DISCONTINUED | OUTPATIENT
Start: 2018-01-08 | End: 2018-01-11

## 2018-01-08 RX ORDER — POTASSIUM CHLORIDE 20 MEQ
40 PACKET (EA) ORAL EVERY 4 HOURS
Qty: 0 | Refills: 0 | Status: COMPLETED | OUTPATIENT
Start: 2018-01-08 | End: 2018-01-08

## 2018-01-08 RX ORDER — TAMSULOSIN HYDROCHLORIDE 0.4 MG/1
0.8 CAPSULE ORAL AT BEDTIME
Qty: 0 | Refills: 0 | Status: DISCONTINUED | OUTPATIENT
Start: 2018-01-08 | End: 2018-01-11

## 2018-01-08 RX ORDER — THIAMINE MONONITRATE (VIT B1) 100 MG
200 TABLET ORAL
Qty: 0 | Refills: 0 | Status: COMPLETED | OUTPATIENT
Start: 2018-01-08 | End: 2018-01-08

## 2018-01-08 RX ADMIN — Medication 250 MILLIGRAM(S): at 16:58

## 2018-01-08 RX ADMIN — FINASTERIDE 5 MILLIGRAM(S): 5 TABLET, FILM COATED ORAL at 13:23

## 2018-01-08 RX ADMIN — HEPARIN SODIUM 5000 UNIT(S): 5000 INJECTION INTRAVENOUS; SUBCUTANEOUS at 22:49

## 2018-01-08 RX ADMIN — Medication 103 MILLIGRAM(S): at 01:39

## 2018-01-08 RX ADMIN — CEFTRIAXONE 100 GRAM(S): 500 INJECTION, POWDER, FOR SOLUTION INTRAMUSCULAR; INTRAVENOUS at 16:58

## 2018-01-08 RX ADMIN — Medication 20 MILLIGRAM(S): at 05:48

## 2018-01-08 RX ADMIN — HEPARIN SODIUM 5000 UNIT(S): 5000 INJECTION INTRAVENOUS; SUBCUTANEOUS at 13:23

## 2018-01-08 RX ADMIN — Medication 50 MILLIGRAM(S): at 22:53

## 2018-01-08 RX ADMIN — LISINOPRIL 2.5 MILLIGRAM(S): 2.5 TABLET ORAL at 05:42

## 2018-01-08 RX ADMIN — Medication 2 DROP(S): at 13:33

## 2018-01-08 RX ADMIN — Medication 20 MILLIGRAM(S): at 16:58

## 2018-01-08 RX ADMIN — Medication 40 MILLIEQUIVALENT(S): at 22:50

## 2018-01-08 RX ADMIN — Medication 50 MILLIGRAM(S): at 16:57

## 2018-01-08 RX ADMIN — ATORVASTATIN CALCIUM 40 MILLIGRAM(S): 80 TABLET, FILM COATED ORAL at 22:49

## 2018-01-08 RX ADMIN — TAMSULOSIN HYDROCHLORIDE 0.8 MILLIGRAM(S): 0.4 CAPSULE ORAL at 22:50

## 2018-01-08 RX ADMIN — Medication 103 MILLIGRAM(S): at 05:42

## 2018-01-08 RX ADMIN — Medication 250 MILLIGRAM(S): at 05:42

## 2018-01-08 RX ADMIN — Medication 102 MILLIGRAM(S): at 06:04

## 2018-01-08 RX ADMIN — Medication 50 MILLIGRAM(S): at 13:23

## 2018-01-08 RX ADMIN — Medication 50 MILLIGRAM(S): at 05:48

## 2018-01-08 RX ADMIN — Medication 40 MILLIEQUIVALENT(S): at 16:58

## 2018-01-08 RX ADMIN — Medication 103 MILLIGRAM(S): at 16:59

## 2018-01-08 RX ADMIN — HEPARIN SODIUM 5000 UNIT(S): 5000 INJECTION INTRAVENOUS; SUBCUTANEOUS at 05:48

## 2018-01-08 RX ADMIN — SPIRONOLACTONE 12.5 MILLIGRAM(S): 25 TABLET, FILM COATED ORAL at 05:42

## 2018-01-08 RX ADMIN — Medication 200 MILLIGRAM(S): at 13:23

## 2018-01-08 NOTE — PHYSICAL THERAPY INITIAL EVALUATION ADULT - PERTINENT HX OF CURRENT PROBLEM, REHAB EVAL
82yoM with PMH HTN, Afib on Xarelto, and unknown further hx presenting with urinary retention, noted to have sushila gross hematuria with clotting on placement of malcolm catheter. Presentation also complicated by AMS. Suspected at this time to have UTI with concerns for developing sepsis.

## 2018-01-08 NOTE — PHYSICAL THERAPY INITIAL EVALUATION ADULT - GAIT DEVIATIONS NOTED, PT EVAL
with cane, pt with decreased step/stride length and flexed posture. with RW, pt with improved velocity and increased step, stride length./decreased kristi

## 2018-01-08 NOTE — PHYSICAL THERAPY INITIAL EVALUATION ADULT - ADDITIONAL COMMENTS
pt states he lives alone in a condo with 2 steps to enter (+rail) then 1 into door. from garage, two steps with no rail. pt was independent without a device prior to admit. denies falls. 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 no DME.

## 2018-01-08 NOTE — PROGRESS NOTE ADULT - SUBJECTIVE AND OBJECTIVE BOX
HOSPITALIST PROGRESS NOTE    ANNY BELLA  [id number]  82yMale    Patient is a 82y old  Male who presents with a chief complaint of urinary retention (04 Jan 2018 16:51)      SUBJECTIVE:   Chart reviewed since last visit.  Patient seen and examined at bedside earlier today for UTI, sepsis, urinary retention.  Denies any fever, chills, abdominal pain, dizzinessm chest pain.  Mild dyspnea, cough - scant phlegm      OBJECTIVE:  Vital Signs Last 24 Hrs  T(C): 36.9 (08 Jan 2018 07:00), Max: 37 (07 Jan 2018 17:10)  T(F): 98.4 (08 Jan 2018 07:00), Max: 98.6 (07 Jan 2018 17:10)  HR: 82 (08 Jan 2018 07:00) (71 - 88)  BP: 150/80 (08 Jan 2018 07:00) (132/90 - 150/80)  RR: 18 (08 Jan 2018 07:00) (18 - 20)  SpO2: 93% (08 Jan 2018 07:00) (93% - 97%)    PHYSICAL EXAMINATION  General: NAD[+]   nontoxic[+]   Elderly male sitting up in chair  HEENT: AT/NC[+]  PERRLA[+]  EOMI[+]  Arcus senilis[+]  NECK: Supple[+]  JVD[] Carotid bruit[]  CVS: RRR[+]  Irregular[]  S1+S2[+]   Murmur[]  RESP: Fair air entry bilaterally[+]   Clear sounds[]   poor effort []  wheeze[-]   Crackles[-]  GI: Soft[+]  Nondistended[+]   Nontender[+]   Bowel Sounds[+]  Mass[]   HSM[]   Ascites[]  : suprapubic tenderness[-]   CVA Tenderness[-]   Berry[+]  MS: FROM[+]   Edema[-]  CNS: AAOx3[+]   grossly intact  INTEG: Skin is Warm[+]  dry[] Lesion[] Decubitus[]  PSYCH: Fair Mood, congruent Affect  MONITOR:  CAPILLARY BLOOD GLUCOSE            I&O's Summary    07 Jan 2018 07:01  -  08 Jan 2018 07:00  --------------------------------------------------------  IN: 200 mL / OUT: 3050 mL / NET: -2850 mL                            13.2   18.2  )-----------( 179      ( 08 Jan 2018 08:07 )             39.2       01-08    146<H>  |  105  |  32.0<H>  ----------------------------<  104  3.0<L>   |  26.0  |  0.71    Ca    8.3<L>      08 Jan 2018 08:07    TPro  5.8<L>  /  Alb  2.6<L>  /  TBili  0.5  /  DBili  x   /  AST  29  /  ALT  14  /  AlkPhos  79  01-07            Culture:  Culture - Urine (01.05.18 @ 00:42)    -  Amikacin: S <=16    -  Ampicillin: R <=8    -  Ampicillin/Sulbactam: R <=8/4    -  Aztreonam: S <=4    -  Cefazolin: R >16    -  Cefepime: S <=4    -  Cefoxitin: R >16    -  Ceftazidime: S 8    -  Ceftriaxone: S <=1    -  Ciprofloxacin: S <=1    -  Ertapenem: S <=1    -  Gentamicin: S <=4    -  Imipenem: S <=1    -  Levofloxacin: S <=2    -  Meropenem: S <=1    -  Nitrofurantoin: I 64    -  Piperacillin/Tazobactam: S <=16    -  Tobramycin: S <=4    -  Trimethoprim/Sulfamethoxazole: S <=2/38    Specimen Source: .Urine Catheterized    Culture Results:   10,000 - 49,000 CFU/mL Enterobacter cloacae    Organism Identification: Enterobacter cloacae    Organism: Enterobacter cloacae    Method Type: PETRONA    Culture - Blood (01.04.18 @ 18:22)    Specimen Source: .Blood Blood-Peripheral    Culture Results:   No growth at 48 hours    Culture - Blood (01.04.18 @ 18:20)    Specimen Source: .Blood Blood-Peripheral    Culture Results:   No growth at 48 hours        MEDICATIONS  (STANDING):  ascorbic acid IVPB 1500 milliGRAM(s) IV Intermittent every 6 hours  atorvastatin 40 milliGRAM(s) Oral at bedtime  cefTRIAXone   IVPB      cefTRIAXone   IVPB 1 Gram(s) IV Intermittent every 24 hours  finasteride 5 milliGRAM(s) Oral daily  fluticasone furoate/vilanterol 100-25 MICROgram(s) Inhaler 1 Puff(s) Inhalation daily  furosemide   Injectable 20 milliGRAM(s) IV Push two times a day  heparin  Injectable 5000 Unit(s) SubCutaneous every 8 hours  hydrocortisone sodium succinate Injectable 50 milliGRAM(s) IV Push every 6 hours  lisinopril 2.5 milliGRAM(s) Oral daily  saccharomyces boulardii 250 milliGRAM(s) Oral two times a day  spironolactone 12.5 milliGRAM(s) Oral daily  tamsulosin 0.8 milliGRAM(s) Oral at bedtime      MEDICATIONS  (PRN):  acetaminophen   Tablet 650 milliGRAM(s) Oral every 6 hours PRN For Temp greater than 38 C (100.4 F)  ALBUTerol/ipratropium for Nebulization 3 milliLiter(s) Nebulizer every 6 hours PRN Shortness of Breath  artificial  tears Solution 2 Drop(s) Both EYES every 4 hours PRN Dry Eyes  benzocaine 15 mG/menthol 3.6 mG Lozenge 1 Lozenge Oral five times a day PRN Sore Throat  naphazoline/pheniramine Solution 1 Drop(s) Both EYES four times a day PRN burning

## 2018-01-08 NOTE — PROGRESS NOTE ADULT - ASSESSMENT
82yoM from home with PMH HTN, AFib on Xarelto, and additional unknown hx presenting with urinary retention x2wks. P s per pt, has been experiencing 2wk hx of urinary retention during the day complicated by 4-5mo hx of nocturia, 6-7x each evening. He has been regularly following Urology Dr. Mark Schumer for the past 5-6 mo and was worked up for retention. Berry catheter was placed at that time, by his report his prostate was reportedly normal during the prior examinations in the office. Since removing the catheter 2wks ago symptoms have occurred as stated previously. Also during that time period he admits to onset of dysuria, difficulty initiating and maintaining stream as well. Also reports 1x episode of rigors in the home during the past week lasting 5min and resolving on its own   PT URINE CX ENTEROBACTER ON IV ABX  WBC COMING DOWN  WILL CONTINUE IV TODAY  HOPEFULLY TRANSITION TO ORAL ABX IF CONTINUES TO IMPROVE  WILL FOLLOW UP

## 2018-01-08 NOTE — PROGRESS NOTE ADULT - SUBJECTIVE AND OBJECTIVE BOX
INTERVAL HPI/OVERNIGHT EVENTS: patient is without complaints currently.  He has been on flomax 0.8 mg at home prior to hospitalization.    MEDICATIONS  (STANDING):  ascorbic acid IVPB 1500 milliGRAM(s) IV Intermittent every 6 hours  atorvastatin 40 milliGRAM(s) Oral at bedtime  cefTRIAXone   IVPB      cefTRIAXone   IVPB 1 Gram(s) IV Intermittent every 24 hours  finasteride 5 milliGRAM(s) Oral daily  furosemide   Injectable 20 milliGRAM(s) IV Push two times a day  heparin  Injectable 5000 Unit(s) SubCutaneous every 8 hours  hydrocortisone sodium succinate Injectable 50 milliGRAM(s) IV Push every 6 hours  lisinopril 2.5 milliGRAM(s) Oral daily  saccharomyces boulardii 250 milliGRAM(s) Oral two times a day  spironolactone 12.5 milliGRAM(s) Oral daily  thiamine 200 milliGRAM(s) Oral two times a day    MEDICATIONS  (PRN):  acetaminophen   Tablet 650 milliGRAM(s) Oral every 6 hours PRN For Temp greater than 38 C (100.4 F)  ALBUTerol/ipratropium for Nebulization 3 milliLiter(s) Nebulizer every 6 hours PRN Shortness of Breath  artificial  tears Solution 2 Drop(s) Both EYES every 4 hours PRN Dry Eyes  benzocaine 15 mG/menthol 3.6 mG Lozenge 1 Lozenge Oral five times a day PRN Sore Throat  naphazoline/pheniramine Solution 1 Drop(s) Both EYES four times a day PRN burning      Allergies    No Known Allergies    Intolerances        Vital Signs Last 24 Hrs  T(C): 36.9 (08 Jan 2018 07:00), Max: 37 (07 Jan 2018 17:10)  T(F): 98.4 (08 Jan 2018 07:00), Max: 98.6 (07 Jan 2018 17:10)  HR: 82 (08 Jan 2018 07:00) (71 - 88)  BP: 150/80 (08 Jan 2018 07:00) (132/90 - 150/80)  BP(mean): --  RR: 18 (08 Jan 2018 07:00) (18 - 20)  SpO2: 93% (08 Jan 2018 07:00) (93% - 97%)    ROS:  as per HPI     ON PE:  General: Well developed; well nourished; in no acute distress  Head: Normocephalic; atraumatic  Respiratory: No tachypnea  Gastrointestinal: Soft non-tender non-distended;  Genitourinary: No costovertebral angle tenderness.  Urinary bladder is clinically not distended  Berry catheter draining clear  Extremities: Normal range of motion, No edema  Neurological: Alert and oriented x4  Skin: Warm and dry. No acute rash  Psychiatric: Cooperative and appropriate      LABS:                        12.6   28.2  )-----------( 167      ( 07 Jan 2018 08:53 )             38.3     01-08    146<H>  |  105  |  32.0<H>  ----------------------------<  104  3.0<L>   |  26.0  |  0.71    Ca    8.3<L>      08 Jan 2018 08:07    TPro  5.8<L>  /  Alb  2.6<L>  /  TBili  0.5  /  DBili  x   /  AST  29  /  ALT  14  /  AlkPhos  79  01-07          RADIOLOGY & ADDITIONAL TESTS:

## 2018-01-08 NOTE — PROGRESS NOTE ADULT - PROBLEM SELECTOR PLAN 2
Maintain malcolm  TOV likely as outpatient.  Currently on finasteride.  Will restart flomax 0.8 mg as he was on this as outpatient.

## 2018-01-08 NOTE — PROGRESS NOTE ADULT - ATTENDING COMMENTS
Hypokalemia, hyponatremia - replace K, am BMP Hypokalemia, hyponatremia - replace K, am BMP.    Patient asking if UTI due to Berry catheterizations in ER. It is unclear if patient had UTI present at the time he came to the ER versus UTI secondary to Berry insertion.

## 2018-01-08 NOTE — PROGRESS NOTE ADULT - SUBJECTIVE AND OBJECTIVE BOX
Formerly [facility], [facility]                                   [address] [address]                                                      PHONE: [phone]                                                         FAX: [phone]  ----------------------------------------------------------------------------------------------------    Pt seen and examined. FU for MR    Overnight events/Complaints: Pt ambulated with mild shortness of breath. Reportedly had known about MR and being managed medically by Dr. Reyes.     Vital Signs Last 24 Hrs  T(C): 36.9 (08 Jan 2018 07:00), Max: 37 (07 Jan 2018 17:10)  T(F): 98.4 (08 Jan 2018 07:00), Max: 98.6 (07 Jan 2018 17:10)  HR: 82 (08 Jan 2018 07:00) (71 - 88)  BP: 150/80 (08 Jan 2018 07:00) (132/90 - 150/80)  BP(mean): --  RR: 18 (08 Jan 2018 07:00) (18 - 20)  SpO2: 93% (08 Jan 2018 07:00) (93% - 97%)  I&O's Summary    07 Jan 2018 07:01  -  08 Jan 2018 07:00  --------------------------------------------------------  IN: 200 mL / OUT: 3050 mL / NET: -2850 mL        PHYSICAL EXAM:  Constitutional: Appears well developed, well nourished; alert and co-operative  HEENT:     Head: Normocephalic and atraumatic  Eyes: Conjunctiva normal, No scleral icterus  Mouth/Throat: Mucous membranes are normal. Mucosa are not pale or dry.  Cardiovascular: Normal S1 S2, + PSM  Respiratory: Lungs clear to auscultation; no crepitations, no wheeze  Gastrointestinal:  Soft, Non-tender, + BS	  Musculoskeletal: Normal range of motion. No edema  Skin: Warm and dry. No cyanosis . No diaphoresis.  Neurologic: Alert oriented to time place and person. Normal strength and no tremor.  Psychiatric: Normal mood and affect, Speech is normal and behavior is normal.        LABS:                        13.2   18.2  )-----------( 179      ( 08 Jan 2018 08:07 )             39.2     01-08    146<H>  |  105  |  32.0<H>  ----------------------------<  104  3.0<L>   |  26.0  |  0.71    Ca    8.3<L>      08 Jan 2018 08:07    TPro  5.8<L>  /  Alb  2.6<L>  /  TBili  0.5  /  DBili  x   /  AST  29  /  ALT  14  /  AlkPhos  79  01-07    RADIOLOGY & ADDITIONAL STUDIES:    CARDIOLOGY TESTING:     ECG: Afib, no st elevations/depressions    ECHO:1/5/2017     Summary:   1. Technically adequate study.   2. Severely decreased global left ventricular systolic function.   3. Left ventricular ejection fraction, by visual estimation, is 30 to   35%.   4. Multiple left ventricular regional wall motion abnormalities exist.   See wall motion findings.   5. There is moderate eccentric left ventricular hypertrophy.   6. Moderately enlarged right ventricle.   7. Severely reduced RV systolic function.   8. Severely enlarged left atrium.   9. Severely dilated right atrium.  10. Mild mitral annular calcification.  11. Thickening of the anterior and posterior mitral valve leaflets.  12. Moderate to severe mitral valve regurgitation.  13. Sclerotic aortic valve with normal opening.  14. Moderate aortic regurgitation.  15. Moderate tricuspid regurgitation. Normal estimated PASP, probably   underestimated due to JENNIFER.  16. Trivial pericardial effusion.    MEDICATIONS:  MEDICATIONS  (STANDING):  ascorbic acid IVPB 1500 milliGRAM(s) IV Intermittent every 6 hours  atorvastatin 40 milliGRAM(s) Oral at bedtime  cefTRIAXone   IVPB      cefTRIAXone   IVPB 1 Gram(s) IV Intermittent every 24 hours  finasteride 5 milliGRAM(s) Oral daily  furosemide   Injectable 20 milliGRAM(s) IV Push two times a day  heparin  Injectable 5000 Unit(s) SubCutaneous every 8 hours  hydrocortisone sodium succinate Injectable 50 milliGRAM(s) IV Push every 6 hours  lisinopril 2.5 milliGRAM(s) Oral daily  saccharomyces boulardii 250 milliGRAM(s) Oral two times a day  spironolactone 12.5 milliGRAM(s) Oral daily  tamsulosin 0.8 milliGRAM(s) Oral at bedtime  thiamine 200 milliGRAM(s) Oral two times a day    MEDICATIONS  (PRN):  acetaminophen   Tablet 650 milliGRAM(s) Oral every 6 hours PRN For Temp greater than 38 C (100.4 F)  ALBUTerol/ipratropium for Nebulization 3 milliLiter(s) Nebulizer every 6 hours PRN Shortness of Breath  artificial  tears Solution 2 Drop(s) Both EYES every 4 hours PRN Dry Eyes  benzocaine 15 mG/menthol 3.6 mG Lozenge 1 Lozenge Oral five times a day PRN Sore Throat  naphazoline/pheniramine Solution 1 Drop(s) Both EYES four times a day PRN burning      ASSESSMENT AND PLAN:     82y Male PMHX HTN, HLD, Afib on Xarelto, /sp unsuccessful ablation at [facility], who presented to [facility] ED complaining of abd pain.  Pt was admitted with urinary retention, encephalopathy, and urosepsis.  He required ICU care for pressors and mechanical ventilation.  Pt underwent surgical placement of a malcolm catheter.  Pt had an echo performed that demonstrated an EF: 30-35%, with severe MR.  Pt with no prior Hx of CHF.  Pt denies cp  palps, but does mention mild sob.     -  Repeat echo pending etiology - ? secondary to septic shock vs takotsubos from spouse passing away 3 weeks ago, vs cad, vs afib  -  pt has requested that Dr Maharaj be notified after echo performed.  -  Continue atrovastatin 40 mg, spironolactone 12.5 mg, lisinopril 2.5 mg daily  -  Continue lasix 20 mg IV BID. Will change to po in AM  -  Will add coreg in AM   -  Further work-up here vs. outpt FU with Dr. Reyes pending echo results.

## 2018-01-08 NOTE — PROGRESS NOTE ADULT - ASSESSMENT
82 year old male with PMH HTN, AF on Xarelto and recent urinary issues presented with urinary retention, encephalopathy and sepsis secondary to lower UTI.  He was taken to the OR and had Berry placed cystoscopically by Dr Ardon on 01/04/17.  He developed severe sepsis with shock and respiraotry failure and was briefly on pressors and vent support. Responded to treatment and stabilized and being downgraded from ICU.  Sepsis, metabolic acidosis, HARPREET, respiratory failure have resolved. No bacteremia apparent. Urine culture with enterobacter cloaca - antibiotics changed to Ceftriaxone by ID.  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 No evidence of overt decompensated CHF clinically. Evaluated by Cardiology, started on low dose ACEI, Aldactone and Lasix.  AF rate controlled. - No anticoagulation as hematuria.  Leukocytosis secondary to steroids - improving.  Mild hypernatremia likely secondary to saline, asymptomatic. Mild hypokalemia secondary to diuretics.  Diarrhea likely secondary to laxatives- resolved  Chronic COPD, mild dyspnea, no hypoxia or wheezing. Resumed on home LAMA.

## 2018-01-08 NOTE — PHYSICAL THERAPY INITIAL EVALUATION ADULT - CRITERIA FOR SKILLED THERAPEUTIC INTERVENTIONS
therapy frequency/anticipated equipment needs at discharge/anticipated discharge recommendation/impairments found/risk reduction/prevention/rehab potential/functional limitations in following categories

## 2018-01-09 LAB
ANION GAP SERPL CALC-SCNC: 14 MMOL/L — SIGNIFICANT CHANGE UP (ref 5–17)
BUN SERPL-MCNC: 35 MG/DL — HIGH (ref 8–20)
CALCIUM SERPL-MCNC: 8.4 MG/DL — LOW (ref 8.6–10.2)
CHLORIDE SERPL-SCNC: 106 MMOL/L — SIGNIFICANT CHANGE UP (ref 98–107)
CO2 SERPL-SCNC: 27 MMOL/L — SIGNIFICANT CHANGE UP (ref 22–29)
CREAT SERPL-MCNC: 0.84 MG/DL — SIGNIFICANT CHANGE UP (ref 0.5–1.3)
GLUCOSE SERPL-MCNC: 89 MG/DL — SIGNIFICANT CHANGE UP (ref 70–115)
POTASSIUM SERPL-MCNC: 3.2 MMOL/L — LOW (ref 3.5–5.3)
POTASSIUM SERPL-SCNC: 3.2 MMOL/L — LOW (ref 3.5–5.3)
SODIUM SERPL-SCNC: 147 MMOL/L — HIGH (ref 135–145)

## 2018-01-09 PROCEDURE — 99233 SBSQ HOSP IP/OBS HIGH 50: CPT

## 2018-01-09 RX ORDER — FUROSEMIDE 40 MG
20 TABLET ORAL DAILY
Qty: 0 | Refills: 0 | Status: DISCONTINUED | OUTPATIENT
Start: 2018-01-09 | End: 2018-01-10

## 2018-01-09 RX ORDER — POTASSIUM CHLORIDE 20 MEQ
40 PACKET (EA) ORAL EVERY 4 HOURS
Qty: 0 | Refills: 0 | Status: COMPLETED | OUTPATIENT
Start: 2018-01-09 | End: 2018-01-09

## 2018-01-09 RX ADMIN — TAMSULOSIN HYDROCHLORIDE 0.8 MILLIGRAM(S): 0.4 CAPSULE ORAL at 21:36

## 2018-01-09 RX ADMIN — Medication 20 MILLIGRAM(S): at 07:13

## 2018-01-09 RX ADMIN — FINASTERIDE 5 MILLIGRAM(S): 5 TABLET, FILM COATED ORAL at 12:25

## 2018-01-09 RX ADMIN — BENZOCAINE AND MENTHOL 1 LOZENGE: 5; 1 LIQUID ORAL at 21:49

## 2018-01-09 RX ADMIN — Medication 2 DROP(S): at 21:50

## 2018-01-09 RX ADMIN — Medication 50 MILLIGRAM(S): at 07:13

## 2018-01-09 RX ADMIN — Medication 103 MILLIGRAM(S): at 10:26

## 2018-01-09 RX ADMIN — SPIRONOLACTONE 12.5 MILLIGRAM(S): 25 TABLET, FILM COATED ORAL at 07:15

## 2018-01-09 RX ADMIN — Medication 250 MILLIGRAM(S): at 16:33

## 2018-01-09 RX ADMIN — Medication 40 MILLIEQUIVALENT(S): at 14:14

## 2018-01-09 RX ADMIN — CEFTRIAXONE 100 GRAM(S): 500 INJECTION, POWDER, FOR SOLUTION INTRAMUSCULAR; INTRAVENOUS at 16:52

## 2018-01-09 RX ADMIN — LISINOPRIL 2.5 MILLIGRAM(S): 2.5 TABLET ORAL at 07:15

## 2018-01-09 RX ADMIN — Medication 250 MILLIGRAM(S): at 07:15

## 2018-01-09 RX ADMIN — HEPARIN SODIUM 5000 UNIT(S): 5000 INJECTION INTRAVENOUS; SUBCUTANEOUS at 14:15

## 2018-01-09 RX ADMIN — HEPARIN SODIUM 5000 UNIT(S): 5000 INJECTION INTRAVENOUS; SUBCUTANEOUS at 21:36

## 2018-01-09 RX ADMIN — Medication 40 MILLIEQUIVALENT(S): at 14:12

## 2018-01-09 RX ADMIN — ATORVASTATIN CALCIUM 40 MILLIGRAM(S): 80 TABLET, FILM COATED ORAL at 21:36

## 2018-01-09 RX ADMIN — HEPARIN SODIUM 5000 UNIT(S): 5000 INJECTION INTRAVENOUS; SUBCUTANEOUS at 07:13

## 2018-01-09 RX ADMIN — Medication 40 MILLIEQUIVALENT(S): at 16:34

## 2018-01-09 NOTE — PROGRESS NOTE ADULT - ASSESSMENT
1. elderly male with urinary retention requiring surgical malcolm insertion. Possible urosepsis altho cultures grew out [id number] gm neg.  2. chronic afib 1. elderly male with urinary retention requiring surgical malcolm insertion. Possible urosepsis altho cultures grew out [phone] gm neg.  2. chronic afib-was on xarelto as op  3. improving cardiomyopathy by echo criteria. ?Takotsubo CM-wife passed away 3 weeks ago. continue medical therapy  4. chronic mitral regurg-medical therapy.

## 2018-01-09 NOTE — PROGRESS NOTE ADULT - ASSESSMENT
82yoM from home with PMH HTN, AFib on Xarelto, and additional unknown hx presenting with urinary retention x2wks. P s per pt, has been experiencing 2wk hx of urinary retention during the day complicated by 4-5mo hx of nocturia, 6-7x each evening. He has been regularly following Urology Dr. Mark Schumer for the past 5-6 mo and was worked up for retention. Berry catheter was placed at that time, by his report his prostate was reportedly normal during the prior examinations in the office. Since removing the catheter 2wks ago symptoms have occurred as stated previously. Also during that time period he admits to onset of dysuria, difficulty initiating and maintaining stream as well. Also reports 1x episode of rigors in the home during the past week lasting 5min and resolving on its own   PT URINE CX ENTEROBACTER ON IV ABX  WBC COMING DOWN  FOR REPEAT CBC  WILL CONTINUE IV TODAY  OK TO TRANSITION TO ORAL CIPRO TO  COMPLETE FULL 2 WEEKS OF THERAPY    WILL FOLLOW UP

## 2018-01-09 NOTE — PROGRESS NOTE ADULT - PROBLEM SELECTOR PLAN 3
Started on low dose ACEI, Aldactone and Lasix.  Will decrease Lasix to 20mg PO daily  Monitor fluid status.  Cardiology follow up

## 2018-01-09 NOTE — PROGRESS NOTE ADULT - SUBJECTIVE AND OBJECTIVE BOX
INTERVAL HPI/OVERNIGHT EVENTS:  Feels well.  Urine draining well.    MEDICATIONS  (STANDING):  atorvastatin 40 milliGRAM(s) Oral at bedtime  cefTRIAXone   IVPB      cefTRIAXone   IVPB 1 Gram(s) IV Intermittent every 24 hours  finasteride 5 milliGRAM(s) Oral daily  fluticasone furoate/vilanterol 100-25 MICROgram(s) Inhaler 1 Puff(s) Inhalation daily  furosemide    Tablet 20 milliGRAM(s) Oral daily  heparin  Injectable 5000 Unit(s) SubCutaneous every 8 hours  lisinopril 2.5 milliGRAM(s) Oral daily  saccharomyces boulardii 250 milliGRAM(s) Oral two times a day  spironolactone 12.5 milliGRAM(s) Oral daily  tamsulosin 0.8 milliGRAM(s) Oral at bedtime    MEDICATIONS  (PRN):  acetaminophen   Tablet 650 milliGRAM(s) Oral every 6 hours PRN For Temp greater than 38 C (100.4 F)  ALBUTerol/ipratropium for Nebulization 3 milliLiter(s) Nebulizer every 6 hours PRN Shortness of Breath  artificial  tears Solution 2 Drop(s) Both EYES every 4 hours PRN Dry Eyes  benzocaine 15 mG/menthol 3.6 mG Lozenge 1 Lozenge Oral five times a day PRN Sore Throat  naphazoline/pheniramine Solution 1 Drop(s) Both EYES four times a day PRN burning      Allergies    No Known Allergies    Intolerances        Vital Signs Last 24 Hrs  T(C): 37 (09 Jan 2018 15:52), Max: 37 (09 Jan 2018 15:52)  T(F): 98.6 (09 Jan 2018 15:52), Max: 98.6 (09 Jan 2018 15:52)  HR: 88 (09 Jan 2018 15:52) (82 - 88)  BP: 149/92 (09 Jan 2018 15:52) (149/92 - 149/92)  BP(mean): --  RR: 18 (09 Jan 2018 15:52) (18 - 18)  SpO2: 98% (09 Jan 2018 15:52) (95% - 98%)     ON PE:  General: alert and awake  Abdomen: soft, nt, nd, bs+           LABS:                        13.2   18.2  )-----------( 179      ( 08 Jan 2018 08:07 )             39.2     01-09    147<H>  |  106  |  35.0<H>  ----------------------------<  89  3.2<L>   |  27.0  |  0.84    Ca    8.4<L>      09 Jan 2018 08:01            RADIOLOGY & ADDITIONAL TESTS:

## 2018-01-09 NOTE — PROGRESS NOTE ADULT - SUBJECTIVE AND OBJECTIVE BOX
HOSPITALIST PROGRESS NOTE    ANNY BELLA  [id number]  82yMale    Patient is a 82y old  Male who presents with a chief complaint of urinary retention (04 Jan 2018 16:51)      SUBJECTIVE:   Chart reviewed since last visit.  Patient seen and examined at bedside earlier today for Urinary retention, UTI.  Denies any chills, fever, abdominal pain, dyspnea, chest pain or palpitations.  Concerned about recurrent retention, UTI.      OBJECTIVE:  Vital Signs Last 24 Hrs  T(C): 37 (09 Jan 2018 15:52), Max: 37 (09 Jan 2018 15:52)  T(F): 98.6 (09 Jan 2018 15:52), Max: 98.6 (09 Jan 2018 15:52)  HR: 88 (09 Jan 2018 15:52) (82 - 88)  BP: 149/92 (09 Jan 2018 15:52) (149/92 - 149/92)  RR: 18 (09 Jan 2018 15:52) (18 - 18)  SpO2: 98% (09 Jan 2018 15:52) (95% - 98%)    PHYSICAL EXAMINATION  General: NAD[+]   nontoxic[+]   Elderly male sitting up in chair  HEENT: AT/NC[+]  PERRLA[+]  EOMI[+]  Arcus senilis[+]  NECK: Supple[+]  JVD[] Carotid bruit[]  CVS: RRR[+]  Irregular[]  S1+S2[+]   Murmur[]  RESP: Fair air entry bilaterally[+]   Clear sounds[]   poor effort []  wheeze[-]   Crackles[-]  GI: Soft[+]  Nondistended[+]   Nontender[+]   Bowel Sounds[+]  Mass[]   HSM[]   Ascites[]  : suprapubic tenderness[-]   CVA Tenderness[-]   Berry[+]  MS: FROM[+]   Edema[-]  CNS: AAOx3[+]   grossly intact       I&O's Summary    08 Jan 2018 07:01  -  09 Jan 2018 07:00  --------------------------------------------------------  IN: 0 mL / OUT: 1800 mL / NET: -1800 mL                            13.2   18.2  )-----------( 179      ( 08 Jan 2018 08:07 )             39.2       01-09    147<H>  |  106  |  35.0<H>  ----------------------------<  89  3.2<L>   |  27.0  |  0.84    Ca    8.4<L>      09 Jan 2018 08:01        < from: TTE Echo Limited or F/U (01.08.18 @ 14:00) >  EXAM:  ECHO TRANSTHORACIC;F U OR LTD      PROCEDURE DATE:  Jan 8 2018   .      INTERPRETATION:  REPORT:    TRANSTHORACIC ECHOCARDIOGRAM REPORT           Patient Name:   ANNY BELLA Patient Location: Inpatient  Medical Rec #:  [id number] #:      [id number]  Account #:                       Height:           71.7 in 182.0 cm  YOB: 1935         Weight:           215.2 lb 97.60 kg  Patient Age:    82 years         BSA:              2.19 m²  Patient Gender: M         BP:               135/84 mmHg        Date of Exam:        1/8/2018 2:00:48 PM  Sonographer:         Vanessa Pace  Referring Physician: Zana Egan MD     Procedure:   Follow up or Limited Echocardiogram.  Indications: Other forms of acute ischemic heart disease - I24.8  Diagnosis:   Cardiomyopathy, unspecified - I42.9           2D AND M-MODE MEASUREMENTS (normal ranges within parentheses):  Left Ventricle:                  Normal  IVSd (2D):              1.60 cm (0.7-1.1)  LVPWd (2D):           1.34 cm (0.7-1.1)  LVIDd (2D):             5.00 cm (3.4-5.7)  Relative Wall Thickness  0.54    (<0.42)     LV SYSTOLIC FUNCTION BY 2D PLANIMETRY (MOD):  EF-A4C View: 59.1 % EF-A2C View: 66.7 % EF-Biplane: 63.7 %     SPECTRAL DOPPLER ANALYSIS (where applicable):  LVOT Vmax:  LVOT VTI:  LVOT Diameter: 2.57 cm     Tricuspid Valve and PA/RV Systolic Pressure: TR Max Velocity: 2.38 m/s   RA Pressure: 15 mmHg RVSP/PASP: 37.7 mmHg        PHYSICIAN INTERPRETATION:  Left Ventricle: The left ventricular internal cavity size is normal. Left   ventricular wall thickness is mildly increased. There is discrete upper   septal thickening (17 mm) without evidence for LVOT obstruction.  Global LV systolic function was normal. Left ventricular ejection   fraction, by visual estimation, is 40 to 45%.        LV Wall Scoring:  The mid and apical inferior septum is akinetic. The apical inferior   segment  and apex are hypokinetic.     Right Ventricle: RV systolic function is normal.  Left Atrium: Severely enlarged left atrium.  Right Atrium: There is right atrial enlargement.  Pericardium: There is no evidence of pericardial effusion.  Mitral Valve: Structurally normal mitral valve, with normal leaflet   excursion. No evidence of mitral stenosis.Moderate to severe mitral   valve regurgitation is seen.  Tricuspid Valve: Structurally normal tricuspid valve, with normal leaflet   excursion. Moderate tricuspid regurgitation is visualized. Estimated   pulmonary artery systolic pressure is 37.7 mmHg assuming a right atrial   pressure of 15 mmHg, which is consistent with borderline pulmonary   hypertension. Incomplete TR jet envelope may underestimate PA systolic   pressure.  Aortic Valve: Normal trileaflet aortic valve with normal opening. No   evidence of aortic stenosis. Moderate aortic valve regurgitation is seen.  Pulmonic Valve: The pulmonic valve was not well visualized.        Summary:   1. Mildly reduced global left ventricular systolic function. Left   ventricular ejection fraction, by visual estimation, is 40-45%. RV   systolic function is normal.   2. Mid and apical inferior septum, apical inferior segment, and apex are   abnormal as described above.   3. Moderate to severe mitral regurgitation.   4. Moderate tricuspid regurgitation.   5. Moderate aortic regurgitation.   6. Estimated pulmonary artery systolic pressure is 37.7 mmHg assuming a   right atrial pressure of 15 mmHg, which is consistent with borderline   pulmonary hypertension.   7. Severely biatrial enlargement.   8. No pericardial effusion.     Allie Garay DO Electronically signed on 1/8/2018 at 7:29:32 PM                *** Final ***    < end of copied text >              MEDICATIONS  (STANDING):  atorvastatin 40 milliGRAM(s) Oral at bedtime  cefTRIAXone   IVPB      cefTRIAXone   IVPB 1 Gram(s) IV Intermittent every 24 hours  finasteride 5 milliGRAM(s) Oral daily  fluticasone furoate/vilanterol 100-25 MICROgram(s) Inhaler 1 Puff(s) Inhalation daily  furosemide    Tablet 20 milliGRAM(s) Oral daily  heparin  Injectable 5000 Unit(s) SubCutaneous every 8 hours  lisinopril 2.5 milliGRAM(s) Oral daily  saccharomyces boulardii 250 milliGRAM(s) Oral two times a day  spironolactone 12.5 milliGRAM(s) Oral daily  tamsulosin 0.8 milliGRAM(s) Oral at bedtime      MEDICATIONS  (PRN):  acetaminophen   Tablet 650 milliGRAM(s) Oral every 6 hours PRN For Temp greater than 38 C (100.4 F)  ALBUTerol/ipratropium for Nebulization 3 milliLiter(s) Nebulizer every 6 hours PRN Shortness of Breath  artificial  tears Solution 2 Drop(s) Both EYES every 4 hours PRN Dry Eyes  benzocaine 15 mG/menthol 3.6 mG Lozenge 1 Lozenge Oral five times a day PRN Sore Throat  naphazoline/pheniramine Solution 1 Drop(s) Both EYES four times a day PRN burning

## 2018-01-09 NOTE — PROGRESS NOTE ADULT - SUBJECTIVE AND OBJECTIVE BOX
Chief Complaint: chart & hx reviewed.    HPI: 83 yo male presented with obstructive uropathy and probable sepsis requiring icu care and pressors. Berry required surgical insertion. Initial echo showed MR (known) bur LVEF 30+% with very poor RV function. Repeat echo showed EF 40+% with good RV and mod to severe MR. He's feeling better w/o dyspnea-no longer using his O2. PMH neg for MI/cp or chf. Hx of hpt but no DM. Hx of chronic afib. Meds reviewed.    PAST MEDICAL & SURGICAL HISTORY:  HTN (hypertension)  Afib  No significant past surgical history: could not assess, pt altered      PREVIOUS DIAGNOSTIC TESTING:      ECHO  FINDINGS: see above    STRESS  FINDINGS:    CATHETERIZATION  FINDINGS:    MEDICATIONS  (STANDING):  ascorbic acid IVPB 1500 milliGRAM(s) IV Intermittent every 6 hours  atorvastatin 40 milliGRAM(s) Oral at bedtime  cefTRIAXone   IVPB      cefTRIAXone   IVPB 1 Gram(s) IV Intermittent every 24 hours  finasteride 5 milliGRAM(s) Oral daily  fluticasone furoate/vilanterol 100-25 MICROgram(s) Inhaler 1 Puff(s) Inhalation daily  furosemide   Injectable 20 milliGRAM(s) IV Push two times a day  heparin  Injectable 5000 Unit(s) SubCutaneous every 8 hours  lisinopril 2.5 milliGRAM(s) Oral daily  potassium chloride    Tablet ER 40 milliEquivalent(s) Oral every 4 hours  saccharomyces boulardii 250 milliGRAM(s) Oral two times a day  spironolactone 12.5 milliGRAM(s) Oral daily  tamsulosin 0.8 milliGRAM(s) Oral at bedtime    MEDICATIONS  (PRN):  acetaminophen   Tablet 650 milliGRAM(s) Oral every 6 hours PRN For Temp greater than 38 C (100.4 F)  ALBUTerol/ipratropium for Nebulization 3 milliLiter(s) Nebulizer every 6 hours PRN Shortness of Breath  artificial  tears Solution 2 Drop(s) Both EYES every 4 hours PRN Dry Eyes  benzocaine 15 mG/menthol 3.6 mG Lozenge 1 Lozenge Oral five times a day PRN Sore Throat  naphazoline/pheniramine Solution 1 Drop(s) Both EYES four times a day PRN burning      FAMILY HISTORY:  No pertinent family history: could not assess, pt altered      ROS: Negative other than as mentioned in HPI.    Vital Signs Last 24 Hrs  T(C): 36.8 (09 Jan 2018 08:00), Max: 36.8 (09 Jan 2018 08:00)  T(F): 98.2 (09 Jan 2018 08:00), Max: 98.2 (09 Jan 2018 08:00)  HR: 82 (09 Jan 2018 08:00) (82 - 85)  BP: 140/80 manually ra. (08 Jan 2018 15:36) (130/78 - 130/78)  BP(mean): --  RR: 14 non-labored (09 Jan 2018 08:00) (18 - 18)  SpO2: 95% (09 Jan 2018 08:00) (95% - 95%)    PHYSICAL EXAM:  General: Appears well developed, well nourished alert and cooperative. elderly afebrile nad  HEENT: Head; normocephalic, atraumatic.  Eyes;   Pupils reactive, cornea wnl.  Neck; Supple, no nodes adenopathy, no NVD or carotid bruit or thyromegaly.  CARDIOVASCULAR; No murmur, rub, gallop or lift. Normal S1 and S2. irreg rhythm with distant tones.  LUNGS; No rales, rhonchi or wheeze. Normal breath sounds bilaterally.  ABDOMEN ; Soft, nontender without mass or organomegaly. bowel sounds normoactive.  EXTREMITIES; No clubbing, cyanosis or edema.  ROM normal.  SKIN; warm and dry with normal turgor.  NEURO; Alert/oriented x 3/normal motor exam. No pathologic reflexes.    PSYCH; normal affect.            INTERPRETATION OF TELEMETRY:    ECG: afib lahb prwp    I&O's Detail    08 Jan 2018 07:01  -  09 Jan 2018 07:00  --------------------------------------------------------  IN:  Total IN: 0 mL    OUT:    Indwelling Catheter - Urethral: 1800 mL  Total OUT: 1800 mL    Total NET: -1800 mL          LABS:                        13.2   18.2  )-----------( 179      ( 08 Jan 2018 08:07 )             39.2     01-09    147<H>  |  106  |  35.0<H>  ----------------------------<  89  3.2<L>   |  27.0  |  0.84    Ca    8.4<L>      09 Jan 2018 08:01              I&O's Summary    08 Jan 2018 07:01  -  09 Jan 2018 07:00  --------------------------------------------------------  IN: 0 mL / OUT: 1800 mL / NET: -1800 mL        RADIOLOGY & ADDITIONAL STUDIES:

## 2018-01-09 NOTE — PROGRESS NOTE ADULT - ASSESSMENT
82 year old male with PMH HTN, AF on Xarelto and recent urinary issues presented with urinary retention, encephalopathy and sepsis secondary to lower UTI.  He was taken to the OR and had Berry placed cystoscopically by Dr Ardon on 01/04/17.  He developed severe sepsis with shock and respiraotry failure and was briefly on pressors and vent support. Responded to treatment and stabilized and being downgraded from ICU.  Sepsis, encephalopathy, metabolic acidosis, HARPREET, respiratory failure have resolved. No bacteremia apparent. Urine culture with enterobacter cloaca - antibiotics changed to Ceftriaxone by ID.  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 No evidence of overt decompensated CHF clinically. Evaluated by Cardiology, started on low dose ACEI, Aldactone and Lasix. Repeat TTE shows improved heart function, persistent VHD. Cardiology recommended conservative medical management.  AF rate controlled. - No anticoagulation as hematuria.  Leukocytosis secondary to steroids - improving.  Mild hypernatremia likely secondary to saline, asymptomatic. Mild hypokalemia secondary to diuretics.  Diarrhea likely secondary to laxatives- resolved  Chronic COPD, mild dyspnea, no hypoxia or wheezing. Resumed on home LAMA.      Patient continues to improve clinically.

## 2018-01-09 NOTE — PROGRESS NOTE ADULT - SUBJECTIVE AND OBJECTIVE BOX
ANNY BELLA is a 82y Male with HPI:  82yoM from home with PMH HTN, AFib on Xarelto, and additional unknown hx presenting with urinary retention x2wks. P s per pt, has been experiencing 2wk hx of urinary retention during the day complicated by 4-5mo hx of nocturia, 6-7x each evening. He has been regularly following Urology Dr. Mark Schumer for the past 5-6 mo and was worked up for retention. Berry catheter was placed at that time, by his report his prostate was reportedly normal during the prior examinations in the office. Since removing the catheter 2wks ago symptoms have occurred as stated previously. Also during that time period he admits to onset of dysuria, difficulty initiating and maintaining stream as well. Also reports 1x episode of rigors in the home during the past week lasting 5min and resolving on its own   PT URINE CX ENTEROBACTER ON IV ABX        Allergies:  No Known Allergies      Medications:  acetaminophen   Tablet 650 milliGRAM(s) Oral every 6 hours PRN  ALBUTerol/ipratropium for Nebulization 3 milliLiter(s) Nebulizer every 6 hours PRN  artificial  tears Solution 2 Drop(s) Both EYES every 4 hours PRN  ascorbic acid IVPB 1500 milliGRAM(s) IV Intermittent every 6 hours  atorvastatin 40 milliGRAM(s) Oral at bedtime  benzocaine 15 mG/menthol 3.6 mG Lozenge 1 Lozenge Oral five times a day PRN  cefTRIAXone   IVPB      cefTRIAXone   IVPB 1 Gram(s) IV Intermittent every 24 hours  finasteride 5 milliGRAM(s) Oral daily  fluticasone furoate/vilanterol 100-25 MICROgram(s) Inhaler 1 Puff(s) Inhalation daily  furosemide   Injectable 20 milliGRAM(s) IV Push two times a day  heparin  Injectable 5000 Unit(s) SubCutaneous every 8 hours  hydrocortisone sodium succinate Injectable 50 milliGRAM(s) IV Push every 6 hours  lisinopril 2.5 milliGRAM(s) Oral daily  naphazoline/pheniramine Solution 1 Drop(s) Both EYES four times a day PRN  potassium chloride    Tablet ER 40 milliEquivalent(s) Oral every 4 hours  saccharomyces boulardii 250 milliGRAM(s) Oral two times a day  spironolactone 12.5 milliGRAM(s) Oral daily  tamsulosin 0.8 milliGRAM(s) Oral at bedtime      ANTIBIOTICS:  ROCEPHIN        Review of Systems: - Negative except as mentioned above     Physical Exam:  I Vital Signs Last 24 Hrs  T(C): 36.8 (09 Jan 2018 08:00), Max: 36.8 (09 Jan 2018 08:00)  T(F): 98.2 (09 Jan 2018 08:00), Max: 98.2 (09 Jan 2018 08:00)  HR: 82 (09 Jan 2018 08:00) (82 - 85)  BP: 130/78 (08 Jan 2018 15:36) (130/78 - 130/78)  BP(mean): --  RR: 18 (09 Jan 2018 08:00) (18 - 18)  SpO2: 95% (09 Jan 2018 08:00) (95% - 95%)    GEN: NAD, pleasant  HEENT: normocephalic and atraumatic. EOMI. DAVID...  NECK: Supple. No carotid bruits.  No lymphadenopathy or thyromegaly.  LUNGS: Clear to auscultation.  HEART: Regular rate and rhythm without murmur.  ABDOMEN: Soft, nontender, and nondistended.  Positive bowel sounds.  No hepatosplenomegaly was noted.  NO REBOUND NO GUARDING  EXTREMITIES: Without any cyanosis, clubbing, rash, lesions or edema.  NEUROLOGIC: Cranial nerves II through XII are grossly intact.    SKIN: No ulceration or induration present.      Labs:                       13.2   18.2  )-----------( 179      ( 08 Jan 2018 08:07 )             39.2   01-09    147<H>  |  106  |  35.0<H>  ----------------------------<  89  3.2<L>   |  27.0  |  0.84    Ca    8.4<L>      09 Jan 2018 08:01

## 2018-01-09 NOTE — PROGRESS NOTE ADULT - ATTENDING COMMENTS
Discussed with ID - recommend 2 week antibiotics total  Discussed with cardiology - recommend medical management and close follow up with primary Cardiologist Dr Reyes ([phone])    Anticipate discharge home with Berry in next 24-48 hours, if continues to improve

## 2018-01-10 ENCOUNTER — TRANSCRIPTION ENCOUNTER (OUTPATIENT)
Age: 83
End: 2018-01-10

## 2018-01-10 LAB
ANION GAP SERPL CALC-SCNC: 14 MMOL/L — SIGNIFICANT CHANGE UP (ref 5–17)
BUN SERPL-MCNC: 36 MG/DL — HIGH (ref 8–20)
CALCIUM SERPL-MCNC: 8.5 MG/DL — LOW (ref 8.6–10.2)
CHLORIDE SERPL-SCNC: 106 MMOL/L — SIGNIFICANT CHANGE UP (ref 98–107)
CO2 SERPL-SCNC: 27 MMOL/L — SIGNIFICANT CHANGE UP (ref 22–29)
CREAT SERPL-MCNC: 0.87 MG/DL — SIGNIFICANT CHANGE UP (ref 0.5–1.3)
CULTURE RESULTS: SIGNIFICANT CHANGE UP
CULTURE RESULTS: SIGNIFICANT CHANGE UP
GLUCOSE SERPL-MCNC: 143 MG/DL — HIGH (ref 70–115)
HCT VFR BLD CALC: 42.8 % — SIGNIFICANT CHANGE UP (ref 42–52)
HGB BLD-MCNC: 14 G/DL — SIGNIFICANT CHANGE UP (ref 14–18)
MAGNESIUM SERPL-MCNC: 1.7 MG/DL — SIGNIFICANT CHANGE UP (ref 1.6–2.6)
MCHC RBC-ENTMCNC: 30.3 PG — SIGNIFICANT CHANGE UP (ref 27–31)
MCHC RBC-ENTMCNC: 32.7 G/DL — SIGNIFICANT CHANGE UP (ref 32–36)
MCV RBC AUTO: 92.6 FL — SIGNIFICANT CHANGE UP (ref 80–94)
PLATELET # BLD AUTO: 189 K/UL — SIGNIFICANT CHANGE UP (ref 150–400)
POTASSIUM SERPL-MCNC: 3 MMOL/L — LOW (ref 3.5–5.3)
POTASSIUM SERPL-SCNC: 3 MMOL/L — LOW (ref 3.5–5.3)
RBC # BLD: 4.62 M/UL — SIGNIFICANT CHANGE UP (ref 4.6–6.2)
RBC # FLD: 13.7 % — SIGNIFICANT CHANGE UP (ref 11–15.6)
SODIUM SERPL-SCNC: 147 MMOL/L — HIGH (ref 135–145)
SPECIMEN SOURCE: SIGNIFICANT CHANGE UP
SPECIMEN SOURCE: SIGNIFICANT CHANGE UP
WBC # BLD: 14.3 K/UL — HIGH (ref 4.8–10.8)
WBC # FLD AUTO: 14.3 K/UL — HIGH (ref 4.8–10.8)

## 2018-01-10 PROCEDURE — 99239 HOSP IP/OBS DSCHRG MGMT >30: CPT

## 2018-01-10 PROCEDURE — 99232 SBSQ HOSP IP/OBS MODERATE 35: CPT

## 2018-01-10 RX ORDER — SPIRONOLACTONE 25 MG/1
0.5 TABLET, FILM COATED ORAL
Qty: 30 | Refills: 0 | OUTPATIENT
Start: 2018-01-10

## 2018-01-10 RX ORDER — TAMSULOSIN HYDROCHLORIDE 0.4 MG/1
2 CAPSULE ORAL
Qty: 0 | Refills: 0 | COMMUNITY
Start: 2018-01-10

## 2018-01-10 RX ORDER — SODIUM CHLORIDE 9 MG/ML
1000 INJECTION, SOLUTION INTRAVENOUS
Qty: 0 | Refills: 0 | COMMUNITY
Start: 2018-01-10

## 2018-01-10 RX ORDER — CARVEDILOL PHOSPHATE 80 MG/1
3.12 CAPSULE, EXTENDED RELEASE ORAL EVERY 12 HOURS
Qty: 0 | Refills: 0 | Status: DISCONTINUED | OUTPATIENT
Start: 2018-01-10 | End: 2018-01-11

## 2018-01-10 RX ORDER — FINASTERIDE 5 MG/1
1 TABLET, FILM COATED ORAL
Qty: 30 | Refills: 0 | OUTPATIENT
Start: 2018-01-10

## 2018-01-10 RX ORDER — CARVEDILOL PHOSPHATE 80 MG/1
1 CAPSULE, EXTENDED RELEASE ORAL
Qty: 60 | Refills: 0 | OUTPATIENT
Start: 2018-01-10

## 2018-01-10 RX ORDER — CEFTRIAXONE 500 MG/1
0 INJECTION, POWDER, FOR SOLUTION INTRAMUSCULAR; INTRAVENOUS
Qty: 0 | Refills: 0 | COMMUNITY

## 2018-01-10 RX ORDER — FLUTICASONE FUROATE AND VILANTEROL TRIFENATATE 100; 25 UG/1; UG/1
1 POWDER RESPIRATORY (INHALATION)
Qty: 0 | Refills: 0 | COMMUNITY
Start: 2018-01-10

## 2018-01-10 RX ORDER — POTASSIUM CHLORIDE 20 MEQ
40 PACKET (EA) ORAL EVERY 4 HOURS
Qty: 0 | Refills: 0 | Status: COMPLETED | OUTPATIENT
Start: 2018-01-10 | End: 2018-01-10

## 2018-01-10 RX ORDER — SODIUM CHLORIDE 9 MG/ML
1000 INJECTION, SOLUTION INTRAVENOUS
Qty: 0 | Refills: 0 | Status: DISCONTINUED | OUTPATIENT
Start: 2018-01-10 | End: 2018-01-11

## 2018-01-10 RX ORDER — SODIUM CHLORIDE 9 MG/ML
1000 INJECTION INTRAMUSCULAR; INTRAVENOUS; SUBCUTANEOUS
Qty: 0 | Refills: 0 | Status: DISCONTINUED | OUTPATIENT
Start: 2018-01-10 | End: 2018-01-10

## 2018-01-10 RX ORDER — SACCHAROMYCES BOULARDII 250 MG
1 POWDER IN PACKET (EA) ORAL
Qty: 0 | Refills: 0 | COMMUNITY
Start: 2018-01-10

## 2018-01-10 RX ORDER — LISINOPRIL 2.5 MG/1
1 TABLET ORAL
Qty: 30 | Refills: 0 | OUTPATIENT
Start: 2018-01-10

## 2018-01-10 RX ORDER — ATORVASTATIN CALCIUM 80 MG/1
1 TABLET, FILM COATED ORAL
Qty: 0 | Refills: 0 | COMMUNITY
Start: 2018-01-10

## 2018-01-10 RX ORDER — IPRATROPIUM/ALBUTEROL SULFATE 18-103MCG
3 AEROSOL WITH ADAPTER (GRAM) INHALATION
Qty: 0 | Refills: 0 | COMMUNITY
Start: 2018-01-10

## 2018-01-10 RX ORDER — ACETAMINOPHEN 500 MG
2 TABLET ORAL
Qty: 0 | Refills: 0 | COMMUNITY
Start: 2018-01-10

## 2018-01-10 RX ADMIN — BENZOCAINE AND MENTHOL 1 LOZENGE: 5; 1 LIQUID ORAL at 21:36

## 2018-01-10 RX ADMIN — SODIUM CHLORIDE 75 MILLILITER(S): 9 INJECTION INTRAMUSCULAR; INTRAVENOUS; SUBCUTANEOUS at 08:58

## 2018-01-10 RX ADMIN — HEPARIN SODIUM 5000 UNIT(S): 5000 INJECTION INTRAVENOUS; SUBCUTANEOUS at 21:36

## 2018-01-10 RX ADMIN — Medication 2 DROP(S): at 18:11

## 2018-01-10 RX ADMIN — CEFTRIAXONE 100 GRAM(S): 500 INJECTION, POWDER, FOR SOLUTION INTRAMUSCULAR; INTRAVENOUS at 14:41

## 2018-01-10 RX ADMIN — HEPARIN SODIUM 5000 UNIT(S): 5000 INJECTION INTRAVENOUS; SUBCUTANEOUS at 14:41

## 2018-01-10 RX ADMIN — LISINOPRIL 2.5 MILLIGRAM(S): 2.5 TABLET ORAL at 06:03

## 2018-01-10 RX ADMIN — Medication 250 MILLIGRAM(S): at 06:03

## 2018-01-10 RX ADMIN — Medication 20 MILLIGRAM(S): at 06:03

## 2018-01-10 RX ADMIN — ATORVASTATIN CALCIUM 40 MILLIGRAM(S): 80 TABLET, FILM COATED ORAL at 21:35

## 2018-01-10 RX ADMIN — Medication 40 MILLIEQUIVALENT(S): at 18:02

## 2018-01-10 RX ADMIN — SPIRONOLACTONE 12.5 MILLIGRAM(S): 25 TABLET, FILM COATED ORAL at 06:03

## 2018-01-10 RX ADMIN — FINASTERIDE 5 MILLIGRAM(S): 5 TABLET, FILM COATED ORAL at 13:05

## 2018-01-10 RX ADMIN — BENZOCAINE AND MENTHOL 1 LOZENGE: 5; 1 LIQUID ORAL at 14:48

## 2018-01-10 RX ADMIN — Medication 250 MILLIGRAM(S): at 18:02

## 2018-01-10 RX ADMIN — Medication 40 MILLIEQUIVALENT(S): at 14:41

## 2018-01-10 RX ADMIN — TAMSULOSIN HYDROCHLORIDE 0.8 MILLIGRAM(S): 0.4 CAPSULE ORAL at 21:36

## 2018-01-10 RX ADMIN — CARVEDILOL PHOSPHATE 3.12 MILLIGRAM(S): 80 CAPSULE, EXTENDED RELEASE ORAL at 18:03

## 2018-01-10 RX ADMIN — SODIUM CHLORIDE 75 MILLILITER(S): 9 INJECTION, SOLUTION INTRAVENOUS at 13:05

## 2018-01-10 RX ADMIN — HEPARIN SODIUM 5000 UNIT(S): 5000 INJECTION INTRAVENOUS; SUBCUTANEOUS at 06:03

## 2018-01-10 NOTE — PROGRESS NOTE ADULT - PROBLEM SELECTOR PROBLEM 1
Septic shock
Acute urinary retention
Prostatitis, unspecified prostatitis type
Prostatitis, unspecified prostatitis type
Urinary retention
Urinary tract infection with hematuria, site unspecified
Septic shock
Acute urinary retention
Prostatitis, unspecified prostatitis type
Acute urinary retention

## 2018-01-10 NOTE — DISCHARGE NOTE ADULT - SECONDARY DIAGNOSIS.
Prostatitis, unspecified prostatitis type Other cardiomyopathy Chronic atrial fibrillation Essential hypertension Enterobacter sepsis Septic shock

## 2018-01-10 NOTE — DISCHARGE NOTE ADULT - MEDICATION SUMMARY - MEDICATIONS TO TAKE
I will START or STAY ON the medications listed below when I get home from the hospital:    acetaminophen 325 mg oral tablet  -- 2 tab(s) by mouth every 6 hours, As needed, For Temp greater than 38 C (100.4 F)  -- Indication: For Pain or fever as needed    tamsulosin 0.4 mg oral capsule  -- 2 cap(s) by mouth once a day (at bedtime)  -- Indication: For Prostate enlargement    atorvastatin 40 mg oral tablet  -- 1 tab(s) by mouth once a day (at bedtime)  -- Indication: For Cholesterol    ipratropium-albuterol 0.5 mg-2.5 mg/3 mLinhalation solution  -- 3 milliliter(s) inhaled every 6 hours, As needed, Shortness of Breath  -- Indication: For breathing    fluticasone-vilanterol 100 mcg-25 mcg/inh inhalation powder  -- 1 puff(s) inhaled once a day  -- Indication: For breathing    cefTRIAXone 1 g/50 mL intravenous solution  -- intravenous once a day.  May switch to PO Ciprofloxacin 500 BID after cardiac catheterization.  Antibiotics end 01/18/18  -- Indication: For infection    Sodium Chloride 0.45% intravenous solution  -- 1000 milliliter(s) intravenous   -- Indication: For Hypernatremia, dehydration    saccharomyces boulardii lyo 250 mg oral capsule  -- 1 cap(s) by mouth 2 times a day  -- Indication: For Probiotic

## 2018-01-10 NOTE — PROGRESS NOTE ADULT - PROBLEM SELECTOR PROBLEM 5
Essential hypertension
HTN (hypertension)
Essential hypertension

## 2018-01-10 NOTE — PROGRESS NOTE ADULT - PROBLEM SELECTOR PLAN 2
Continue IV antibiotics  WBC in am Continue IV antibiotics - may switch to PO  and complete 2 week course.  WBC in am

## 2018-01-10 NOTE — DISCHARGE NOTE ADULT - CARE PROVIDER_API CALL
Landen Reyes  Phone: (   )    -  Fax: (   )    -    Schumer, Marc A (MD), Urology  [address] [place] [id number]  Phone: [phone]  Fax: [phone]

## 2018-01-10 NOTE — DISCHARGE NOTE ADULT - CARE PLAN
Principal Discharge DX:	Acute urinary retention  Secondary Diagnosis:	Prostatitis, unspecified prostatitis type  Secondary Diagnosis:	Other cardiomyopathy  Secondary Diagnosis:	Chronic atrial fibrillation  Secondary Diagnosis:	Essential hypertension  Secondary Diagnosis:	Enterobacter sepsis  Secondary Diagnosis:	Septic shock Principal Discharge DX:	Acute urinary retention  Goal:	eventual removal of Berry  Instructions for follow-up, activity and diet:	Continue medications as prescribed.  Maintain Berry x 2 weeks.  Follow up with primary Urologist Mark Schumer  Secondary Diagnosis:	Prostatitis, unspecified prostatitis type  Instructions for follow-up, activity and diet:	Complete antibiotics course as prescribed.  Secondary Diagnosis:	Other cardiomyopathy  Instructions for follow-up, activity and diet:	Diet and medications as prescribed.  Ischemic evaluation as pre primary Cardiologist  Secondary Diagnosis:	Chronic atrial fibrillation  Instructions for follow-up, activity and diet:	Diet and medications as prescribed.  May resume Xarelto after Cardiac Catheterization - monitor for hematuria  Secondary Diagnosis:	Essential hypertension  Instructions for follow-up, activity and diet:	Diet and medications as prescribed.  Follow up with Cardiologist  Secondary Diagnosis:	Enterobacter sepsis  Goal:	resolved  Instructions for follow-up, activity and diet:	Complete antibiotic course  Secondary Diagnosis:	Septic shock  Goal:	resolved

## 2018-01-10 NOTE — PROGRESS NOTE ADULT - PROBLEM SELECTOR PLAN 1
Continue Berry  Flomax  urology follow up Continue Berry - will go home  Flomax, Finasteride  urology follow up

## 2018-01-10 NOTE — PROGRESS NOTE ADULT - ASSESSMENT
82 year old male with PMH HTN, AF on Xarelto and recent urinary issues presented with urinary retention, encephalopathy and sepsis secondary to lower UTI.  He was taken to the OR and had Berry placed cystoscopically by Dr Ardon on 01/04/17.  He developed severe sepsis with shock and respiraotry failure and was briefly on pressors and vent support. Responded to treatment and stabilized and being downgraded from ICU.  Sepsis, encephalopathy, metabolic acidosis, HARPREET, respiratory failure have resolved. No bacteremia apparent. Urine culture with enterobacter cloaca - antibiotics changed to Ceftriaxone by ID.  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 No evidence of overt decompensated CHF clinically. Evaluated by Cardiology, started on low dose ACEI, Aldactone and Lasix. Repeat TTE shows improved heart function, persistent VHD. Cardiology recommended conservative medical management.  AF rate controlled. - No anticoagulation as hematuria.  Leukocytosis secondary to steroids - improving.  Mild hypernatremia likely secondary to saline, asymptomatic. Mild hypokalemia secondary to diuretics.  Diarrhea likely secondary to laxatives- resolved  Chronic COPD, mild dyspnea, no hypoxia or wheezing. Resumed on home LAMA.      Patient continues to improve clinically. 82 year old male with PMH HTN, AF on Xarelto and recent urinary issues presented with urinary retention, encephalopathy and sepsis secondary to lower UTI.  He was taken to the OR and had Berry placed cystoscopically by Dr Ardon on 01/04/17.  He developed severe sepsis with shock and respiraotry failure and was briefly on pressors and vent support. Responded to treatment and stabilized and being downgraded from ICU.  Sepsis, encephalopathy, metabolic acidosis, HARPREET, respiratory failure have resolved. No bacteremia apparent. Urine culture with enterobacter cloaca - antibiotics changed to Ceftriaxone by ID. Leukocytosis continues to improve, remains afebrile  TTE showing WMA - CMP likely secondary to sepsis. No evidence of overt decompensated CHF clinically. Evaluated by Cardiology, started on low dose ACEI, Aldactone and Lasix. Repeat TTE shows improved heart function, persistent VHD. Cardiology recommended conservative medical management, as patient does not want cardiac catheterization - now wants to go to primary cardiologist Dr Reyes at [facility] for further ischemic evaluation.  AF rate controlled. - No anticoagulation as hematuria.  Mild hypernatremia likely secondary to saline, asymptomatic. Mild hypokalemia secondary to diuretics.  Diarrhea likely secondary to laxatives- resolved  Chronic COPD, mild dyspnea, no hypoxia or wheezing. Resumed on home LAMA.      Patient continues to improve clinically.

## 2018-01-10 NOTE — DISCHARGE NOTE ADULT - HOSPITAL COURSE
82 year old male with PMH HTN, AF on Xarelto and recent urinary issues presented with urinary retention, encephalopathy and sepsis secondary to lower UTI.  He was taken to the OR and had Berry placed cystoscopically by Dr Ardon on 01/04/17.  He developed severe sepsis with shock and respiraotry failure and was briefly on pressors and vent support. Responded to treatment and stabilized and being downgraded from ICU.  Sepsis, encephalopathy, metabolic acidosis, HARPREET, respiratory failure have resolved. No bacteremia apparent. Urine culture with enterobacter cloaca - antibiotics changed to Ceftriaxone by ID. Leukocytosis continues to improve, remains afebrile  TTE showing WMA - CMP likely secondary to sepsis. No evidence of overt decompensated CHF clinically. Evaluated by Cardiology, started on low dose ACEI, Aldactone and Lasix. Repeat TTE shows improved heart function, persistent VHD. Cardiology recommended conservative medical management, as patient does not want cardiac catheterization - now wants to go to primary cardiologist Dr Reyes at [facility] for further ischemic evaluation.  AF rate controlled. - No anticoagulation as hematuria.  Mild hypernatremia likely secondary to saline, asymptomatic. Mild hypokalemia secondary to diuretics.  Diarrhea likely secondary to laxatives- resolved  Chronic COPD, mild dyspnea, no hypoxia or wheezing. Resumed on home LAMA. 82 year old male with PMH HTN, AF on Xarelto and recent urinary issues presented with urinary retention, encephalopathy and sepsis secondary to lower UTI.  He was taken to the OR and had Berry placed cystoscopically by Dr Ardon on 01/04/17. Recommendations to retain Berry for at least 2 weeks. Also started on Flomax and Finasteride.    He developed severe sepsis with shock and respiratory failure and was briefly on pressors and vent support. Responded to treatment and stabilized and was downgraded from ICU.    Sepsis, encephalopathy, metabolic acidosis, HARPREET, respiratory failure have resolved. No bacteremia apparent. Urine culture with enterobacter cloaca - antibiotics changed to Ceftriaxone by ID. Leukocytosis continues to improve, remains afebrile.    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 No evidence of overt decompensated CHF clinically. Evaluated by Cardiology, started on low dose ACEI, Aldactone and Lasix. Repeat TTE shows improved heart function, persistent VHD. Cardiology recommended conservative medical management, as patient did want cardiac catheterization at [place] - instead wants to go to primary cardiologist Dr Reyes at [facility] for further ischemic evaluation.    AF rate controlled. - No anticoagulation as hematuria initially. May resume Xarelto after cardiac catheterization and monitor for hematuria.    Mild hypernatremia likely secondary to saline, asymptomatic. Mild hypokalemia secondary to diuretics, replete.    Diarrhea likely secondary to laxatives- resolved    Chronic COPD, mild dyspnea, no hypoxia or wheezing. Resumed on home LAMA.    Discharge time - 45 minutes

## 2018-01-10 NOTE — PROGRESS NOTE ADULT - PROBLEM SELECTOR PROBLEM 4
Chronic atrial fibrillation
Atrial fibrillation
Chronic atrial fibrillation
Chronic atrial fibrillation

## 2018-01-10 NOTE — PROGRESS NOTE ADULT - ATTENDING COMMENTS
Discussed with ID - recommend 2 week antibiotics total  Discussed with cardiology - recommend medical management and close follow up with primary Cardiologist Dr Reyes ([phone])    Anticipate discharge home with Berry in next 24-48 hours, if continues to improve Anticipate transfer to [facility] as per patient preference.

## 2018-01-10 NOTE — PROGRESS NOTE ADULT - PROBLEM SELECTOR PLAN 4
rate controlled -   Resume Xarelto once ok with Urology rate controlled -   Resume Xarelto in  am as per discussion with Dr Perea earlier today.  Will not start as anticipate cardiac catheterization

## 2018-01-10 NOTE — PROGRESS NOTE ADULT - SUBJECTIVE AND OBJECTIVE BOX
INTERVAL HPI/OVERNIGHT EVENTS:    MEDICATIONS  (STANDING):  atorvastatin 40 milliGRAM(s) Oral at bedtime  cefTRIAXone   IVPB      cefTRIAXone   IVPB 1 Gram(s) IV Intermittent every 24 hours  finasteride 5 milliGRAM(s) Oral daily  fluticasone furoate/vilanterol 100-25 MICROgram(s) Inhaler 1 Puff(s) Inhalation daily  furosemide    Tablet 20 milliGRAM(s) Oral daily  heparin  Injectable 5000 Unit(s) SubCutaneous every 8 hours  lisinopril 2.5 milliGRAM(s) Oral daily  saccharomyces boulardii 250 milliGRAM(s) Oral two times a day  spironolactone 12.5 milliGRAM(s) Oral daily  tamsulosin 0.8 milliGRAM(s) Oral at bedtime    MEDICATIONS  (PRN):  acetaminophen   Tablet 650 milliGRAM(s) Oral every 6 hours PRN For Temp greater than 38 C (100.4 F)  ALBUTerol/ipratropium for Nebulization 3 milliLiter(s) Nebulizer every 6 hours PRN Shortness of Breath  artificial  tears Solution 2 Drop(s) Both EYES every 4 hours PRN Dry Eyes  benzocaine 15 mG/menthol 3.6 mG Lozenge 1 Lozenge Oral five times a day PRN Sore Throat  naphazoline/pheniramine Solution 1 Drop(s) Both EYES four times a day PRN burning      Allergies    No Known Allergies    Intolerances        Vital Signs Last 24 Hrs  T(C): 37.1 (10 Shmuel 2018 06:00), Max: 37.1 (10 Shmuel 2018 06:00)  T(F): 98.7 (10 Shmuel 2018 06:00), Max: 98.7 (10 Shmuel 2018 06:00)  HR: 99 (10 Shmuel 2018 06:00) (82 - 99)  BP: 153/87 (10 Shmuel 2018 06:00) (144/92 - 153/87)  BP(mean): --  RR: 18 (10 Shmuel 2018 06:00) (18 - 18)  SpO2: 95% (10 Shmuel 2018 06:00) (95% - 99%)     ON PE:  General: alert and awake  Abdomen: soft ND/NT. No flank pain  : Berry intact urine clearing on CBI.    LABS:                        13.2   18.2  )-----------( 179      ( 08 Jan 2018 08:07 )             39.2     01-09    147<H>  |  106  |  35.0<H>  ----------------------------<  89  3.2<L>   |  27.0  |  0.84    Ca    8.4<L>      09 Jan 2018 08:01            RADIOLOGY & ADDITIONAL TESTS:

## 2018-01-10 NOTE — PROGRESS NOTE ADULT - PROBLEM SELECTOR PROBLEM 7
Acute respiratory failure with hypoxia

## 2018-01-10 NOTE — PROGRESS NOTE ADULT - SUBJECTIVE AND OBJECTIVE BOX
[place] [facility] - [facility], THE HEART CENTER                                   [address] [place] [address]                                                      PHONE: [phone]                                                         FAX: [phone]  [email or web address]  ---------------------------------------------------------------------------------------------------------------------------------    Overnight events/patient complaints:  no events    PAST MEDICAL & SURGICAL HISTORY:  HTN (hypertension)  Afib  No significant past surgical history: could not assess, pt altered      No Known Allergies    MEDICATIONS  (STANDING):  atorvastatin 40 milliGRAM(s) Oral at bedtime  cefTRIAXone   IVPB      cefTRIAXone   IVPB 1 Gram(s) IV Intermittent every 24 hours  finasteride 5 milliGRAM(s) Oral daily  fluticasone furoate/vilanterol 100-25 MICROgram(s) Inhaler 1 Puff(s) Inhalation daily  furosemide    Tablet 20 milliGRAM(s) Oral daily  heparin  Injectable 5000 Unit(s) SubCutaneous every 8 hours  lisinopril 2.5 milliGRAM(s) Oral daily  saccharomyces boulardii 250 milliGRAM(s) Oral two times a day  sodium chloride 0.9%. 1000 milliLiter(s) (75 mL/Hr) IV Continuous <Continuous>  spironolactone 12.5 milliGRAM(s) Oral daily  tamsulosin 0.8 milliGRAM(s) Oral at bedtime    MEDICATIONS  (PRN):  acetaminophen   Tablet 650 milliGRAM(s) Oral every 6 hours PRN For Temp greater than 38 C (100.4 F)  ALBUTerol/ipratropium for Nebulization 3 milliLiter(s) Nebulizer every 6 hours PRN Shortness of Breath  artificial  tears Solution 2 Drop(s) Both EYES every 4 hours PRN Dry Eyes  benzocaine 15 mG/menthol 3.6 mG Lozenge 1 Lozenge Oral five times a day PRN Sore Throat  naphazoline/pheniramine Solution 1 Drop(s) Both EYES four times a day PRN burning      Vital Signs Last 24 Hrs  T(C): 37.1 (10 Shmuel 2018 06:00), Max: 37.1 (10 Shmuel 2018 06:00)  T(F): 98.7 (10 Shmuel 2018 06:00), Max: 98.7 (10 Shmuel 2018 06:00)  HR: 99 (10 Shmuel 2018 06:00) (88 - 99)  BP: 153/87 (10 Shmuel 2018 06:00) (144/92 - 153/87)  BP(mean): --  RR: 18 (10 Shmuel 2018 06:00) (18 - 18)  SpO2: 95% (10 Shmuel 2018 06:00) (95% - 99%)  ICU Vital Signs Last 24 Hrs  ANNY BELLA  I&O's Detail    09 Jan 2018 07:01  -  10 Shmuel 2018 07:00  --------------------------------------------------------  IN:    Oral Fluid: 120 mL  Total IN: 120 mL    OUT:  Total OUT: 0 mL    Total NET: 120 mL        I&O's Summary    09 Jan 2018 07:01  -  10 Shmuel 2018 07:00  --------------------------------------------------------  IN: 120 mL / OUT: 0 mL / NET: 120 mL      Drug Dosing Weight  ANNY BELLA      PHYSICAL EXAM:  General: Appears well developed, well nourished alert and cooperative.  HEENT: Head; normocephalic, atraumatic.  Eyes: Pupils reactive, cornea wnl.  Neck: Supple, no nodes adenopathy, no NVD or carotid bruit or thyromegaly.  CARDIOVASCULAR: Normal S1 and S2, No murmur, rub, gallop or lift.   LUNGS: No rales, rhonchi or wheeze. Normal breath sounds bilaterally.  ABDOMEN: Soft, nontender without mass or organomegaly. bowel sounds normoactive.  EXTREMITIES: No clubbing, cyanosis or edema. Distal pulses wnl.   SKIN: warm and dry with normal turgor.  NEURO: Alert/oriented x 3/normal motor exam. No pathologic reflexes.    PSYCH: normal affect.        LABS:    01-09    147<H>  |  106  |  35.0<H>  ----------------------------<  89  3.2<L>   |  27.0  |  0.84    Ca    8.4<L>      09 Jan 2018 08:01      ANNY BELLA     RADIOLOGY & ADDITIONAL STUDIES:    INTERPRETATION OF TELEMETRY (personally reviewed):       ASSESSMENT AND PLAN:  In summary, ANNY BELLA is an 82y Male PMHX HTN, HLD, Afib on Xarelto, /sp unsuccessful ablation at [facility], who presented to [facility] ED complaining of abd pain.  Pt was admitted with urinary retention, encephalopathy, and urosepsis.  He required ICU care for pressors and mechanical ventilation.  Pt underwent surgical placement of a malcolm catheter.  Pt had an echo performed that demonstrated an Ef 30-35%, with severe MR.  Pt with no prior Hx of CHF.  Pt denies cp, palps, but does mention mild sob.     New onset HFrEF:   -etiology - ? secondary to septic shock vs takotsubos from spouse passing away 3 weeks ago, vs cad, vs afib  -will need ischemic work up   -Cardiac cath today   -pt has requested that Dr Maharaj be updated   -  atrovastatin 40 mg  -  lasix 20 mg po daily - on hold as per appears dry   - spironolactone 12.5 mg   -  lisinopril 2.5 mg daily  - coreg  3.125 mg bid     Thank you for allowing [facility] to participate in the care of this patient.  Please feel free to call with any questions or concerns. [place] [facility] - [facility], THE HEART C[facility]                                   [address] [place] [address]                                                      PHONE: [phone]                                                         FAX: [phone]  [email or web address]  ---------------------------------------------------------------------------------------------------------------------------------    Overnight events/patient complaints:  no events    PAST MEDICAL & SURGICAL HISTORY:  HTN (hypertension)  Afib  No significant past surgical history: could not assess, pt altered      No Known Allergies    MEDICATIONS  (STANDING):  atorvastatin 40 milliGRAM(s) Oral at bedtime  cefTRIAXone   IVPB      cefTRIAXone   IVPB 1 Gram(s) IV Intermittent every 24 hours  finasteride 5 milliGRAM(s) Oral daily  fluticasone furoate/vilanterol 100-25 MICROgram(s) Inhaler 1 Puff(s) Inhalation daily  furosemide    Tablet 20 milliGRAM(s) Oral daily  heparin  Injectable 5000 Unit(s) SubCutaneous every 8 hours  lisinopril 2.5 milliGRAM(s) Oral daily  saccharomyces boulardii 250 milliGRAM(s) Oral two times a day  sodium chloride 0.9%. 1000 milliLiter(s) (75 mL/Hr) IV Continuous <Continuous>  spironolactone 12.5 milliGRAM(s) Oral daily  tamsulosin 0.8 milliGRAM(s) Oral at bedtime    MEDICATIONS  (PRN):  acetaminophen   Tablet 650 milliGRAM(s) Oral every 6 hours PRN For Temp greater than 38 C (100.4 F)  ALBUTerol/ipratropium for Nebulization 3 milliLiter(s) Nebulizer every 6 hours PRN Shortness of Breath  artificial  tears Solution 2 Drop(s) Both EYES every 4 hours PRN Dry Eyes  benzocaine 15 mG/menthol 3.6 mG Lozenge 1 Lozenge Oral five times a day PRN Sore Throat  naphazoline/pheniramine Solution 1 Drop(s) Both EYES four times a day PRN burning      Vital Signs Last 24 Hrs  T(C): 37.1 (10 Shmuel 2018 06:00), Max: 37.1 (10 Shmuel 2018 06:00)  T(F): 98.7 (10 Shmuel 2018 06:00), Max: 98.7 (10 Shmuel 2018 06:00)  HR: 99 (10 Shmuel 2018 06:00) (88 - 99)  BP: 153/87 (10 Shmuel 2018 06:00) (144/92 - 153/87)  BP(mean): --  RR: 18 (10 Shmuel 2018 06:00) (18 - 18)  SpO2: 95% (10 Shmuel 2018 06:00) (95% - 99%)  ICU Vital Signs Last 24 Hrs  ANNY BELLA  I&O's Detail    09 Jan 2018 07:01  -  10 Shmuel 2018 07:00  --------------------------------------------------------  IN:    Oral Fluid: 120 mL  Total IN: 120 mL    OUT:  Total OUT: 0 mL    Total NET: 120 mL        I&O's Summary    09 Jan 2018 07:01  -  10 Shmuel 2018 07:00  --------------------------------------------------------  IN: 120 mL / OUT: 0 mL / NET: 120 mL      Drug Dosing Weight  ANNY BELLA      PHYSICAL EXAM:  General: Appears well developed, well nourished alert and cooperative.  HEENT: Head; normocephalic, atraumatic.  Eyes: Pupils reactive, cornea wnl.  Neck: Supple, no nodes adenopathy, no NVD or carotid bruit or thyromegaly.  CARDIOVASCULAR: Normal S1 and S2, No murmur, rub, gallop or lift.   LUNGS: No rales, rhonchi or wheeze. Normal breath sounds bilaterally.  ABDOMEN: Soft, nontender without mass or organomegaly. bowel sounds normoactive.  EXTREMITIES: No clubbing, cyanosis or edema. Distal pulses wnl.   SKIN: warm and dry with normal turgor.  NEURO: Alert/oriented x 3/normal motor exam. No pathologic reflexes.    PSYCH: normal affect.        LABS:    01-09    147<H>  |  106  |  35.0<H>  ----------------------------<  89  3.2<L>   |  27.0  |  0.84    Ca    8.4<L>      09 Jan 2018 08:01      ANNY BELLA     RADIOLOGY & ADDITIONAL STUDIES:    INTERPRETATION OF TELEMETRY (personally reviewed):       ASSESSMENT AND PLAN:  In summary, ANNY BELLA is an 82y Male PMHX HTN, HLD, Afib on Xarelto, /sp unsuccessful ablation at [facility], who presented to [facility] ED complaining of abd pain.  Pt was admitted with urinary retention, encephalopathy, and urosepsis.  He required ICU care for pressors and mechanical ventilation.  Pt underwent surgical placement of a malcolm catheter.  Pt had an echo performed that demonstrated an Ef 30-35%, with severe MR.  Pt with no prior Hx of CHF.  Pt denies cp, palps, but does mention mild sob.     New onset HFrEF:   -etiology - ? secondary to septic shock vs takotsubos from spouse passing away 3 weeks ago, vs cad, vs afib  -will need ischemic work up -Cardiac cath   -pt requesting transfer to [facility]   -pt has requested that Dr Maharaj be updated   -  atrovastatin 40 mg  -  lasix 20 mg po daily - on hold as per appears dry   - spironolactone 12.5 mg   -  lisinopril 2.5 mg daily  - coreg  3.125 mg bid       Thank you for allowing [facility] to participate in the care of this patient.  Please feel free to call with any questions or concerns.

## 2018-01-10 NOTE — PROGRESS NOTE ADULT - SUBJECTIVE AND OBJECTIVE BOX
ANNY BELLA is a 82y Male with HPI:  82yoM from home with PMH HTN, AFib on Xarelto, and additional unknown hx presenting with urinary retention x2wks. P s per pt, has been experiencing 2wk hx of urinary retention during the day complicated by 4-5mo hx of nocturia, 6-7x each evening. He has been regularly following Urology Dr. Mark Schumer for the past 5-6 mo and was worked up for retention. Berry catheter was placed at that time, by his report his prostate was reportedly normal during the prior examinations in the office. Since removing the catheter 2wks ago symptoms have occurred as stated previously. Also during that time period he admits to onset of dysuria, difficulty initiating and maintaining stream as well. Also reports 1x episode of rigors in the home during the past week lasting 5min and resolving on its own   PT URINE CX ENTEROBACTER ON IV ABX  FEELS BETTER      Allergies:  No Known Allergies      Medications:  acetaminophen   Tablet 650 milliGRAM(s) Oral every 6 hours PRN  ALBUTerol/ipratropium for Nebulization 3 milliLiter(s) Nebulizer every 6 hours PRN  artificial  tears Solution 2 Drop(s) Both EYES every 4 hours PRN  ascorbic acid IVPB 1500 milliGRAM(s) IV Intermittent every 6 hours  atorvastatin 40 milliGRAM(s) Oral at bedtime  benzocaine 15 mG/menthol 3.6 mG Lozenge 1 Lozenge Oral five times a day PRN  cefTRIAXone   IVPB      cefTRIAXone   IVPB 1 Gram(s) IV Intermittent every 24 hours  finasteride 5 milliGRAM(s) Oral daily  fluticasone furoate/vilanterol 100-25 MICROgram(s) Inhaler 1 Puff(s) Inhalation daily  furosemide   Injectable 20 milliGRAM(s) IV Push two times a day  heparin  Injectable 5000 Unit(s) SubCutaneous every 8 hours  hydrocortisone sodium succinate Injectable 50 milliGRAM(s) IV Push every 6 hours  lisinopril 2.5 milliGRAM(s) Oral daily  naphazoline/pheniramine Solution 1 Drop(s) Both EYES four times a day PRN  potassium chloride    Tablet ER 40 milliEquivalent(s) Oral every 4 hours  saccharomyces boulardii 250 milliGRAM(s) Oral two times a day  spironolactone 12.5 milliGRAM(s) Oral daily  tamsulosin 0.8 milliGRAM(s) Oral at bedtime      ANTIBIOTICS:  ROCEPHIN        Review of Systems: - Negative except as mentioned above     Physical Exam:   Vital Signs Last 24 Hrs  T(C): 36.7 (10 Shmuel 2018 08:00), Max: 37.1 (10 Shmuel 2018 06:00)  T(F): 98.1 (10 Shmuel 2018 08:00), Max: 98.7 (10 Shmuel 2018 06:00)  HR: 95 (10 Shmuel 2018 08:00) (88 - 99)  BP: 140/91 (10 Shmuel 2018 08:00) (140/91 - 153/87)  BP(mean): --  RR: 18 (10 Shmuel 2018 08:00) (18 - 18)  SpO2: 96% (10 Shmuel 2018 08:00) (95% - 99%)    GEN: NAD, pleasant  HEENT: normocephalic and atraumatic. EOMI. DAVID...  NECK: Supple. No carotid bruits.  No lymphadenopathy or thyromegaly.  LUNGS: Clear to auscultation.  HEART: Regular rate and rhythm without murmur.  ABDOMEN: Soft, nontender, and nondistended.  Positive bowel sounds.  No hepatosplenomegaly was noted.  NO REBOUND NO GUARDING  EXTREMITIES: Without any cyanosis, clubbing, rash, lesions or edema.  NEUROLOGIC: Cranial nerves II through XII are grossly intact.    SKIN: No ulceration or induration present.      Labs:

## 2018-01-10 NOTE — DISCHARGE NOTE ADULT - PLAN OF CARE
eventual removal of Berry Continue medications as prescribed.  Maintain Berry x 2 weeks.  Follow up with primary Urologist Mark Schumer Complete antibiotics course as prescribed. Diet and medications as prescribed.  Ischemic evaluation as pre primary Cardiologist Diet and medications as prescribed.  May resume Xarelto after Cardiac Catheterization - monitor for hematuria Diet and medications as prescribed.  Follow up with Cardiologist resolved Complete antibiotic course

## 2018-01-10 NOTE — PROGRESS NOTE ADULT - PROBLEM SELECTOR PROBLEM 3
Hematuria, unspecified type
Afib
Other cardiomyopathy
Acute urinary retention
Other cardiomyopathy
Other cardiomyopathy

## 2018-01-10 NOTE — PROGRESS NOTE ADULT - SUBJECTIVE AND OBJECTIVE BOX
HOSPITALIST PROGRESS NOTE    ANNY BELLA  [id number]  82yMale    Patient is a 82y old  Male who presents with a chief complaint of urinary retention (04 Jan 2018 16:51)      SUBJECTIVE:   Chart reviewed since last visit.  Patient seen and examined at bedside.      OBJECTIVE:  Vital Signs Last 24 Hrs  T(C): 37.2 (10 Shmuel 2018 15:11), Max: 37.2 (10 Shmuel 2018 15:11)  T(F): 99 (10 Shmuel 2018 15:11), Max: 99 (10 Shmuel 2018 15:11)  HR: 86 (10 Shmuel 2018 15:11) (86 - 99)  BP: 143/90 (10 Shmuel 2018 15:11) (140/91 - 153/87)  BP(mean): --  RR: 18 (10 Shmuel 2018 15:11) (18 - 18)  SpO2: 95% (10 Shmuel 2018 15:11) (95% - 99%)       PHYSICAL EXAMINATION  General: NAD[+]   nontoxic[+]   Elderly male sitting up in chair  HEENT: AT/NC[+]  PERRLA[+]  EOMI[+]  Arcus senilis[+]  NECK: Supple[+]  JVD[] Carotid bruit[]  CVS: RRR[+]  Irregular[]  S1+S2[+]   Murmur[]  RESP: Fair air entry bilaterally[+]   Clear sounds[]   poor effort []  wheeze[-]   Crackles[-]  GI: Soft[+]  Nondistended[+]   Nontender[+]   Bowel Sounds[+]  Mass[]   HSM[]   Ascites[]  : suprapubic tenderness[-]   CVA Tenderness[-]   Berry[+]  MS: FROM[+]   Edema[-]  CNS: AAOx3[+]   grossly intact       I&O's Summary    09 Jan 2018 07:01  -  10 Shmuel 2018 07:00  --------------------------------------------------------  IN: 120 mL / OUT: 0 mL / NET: 120 mL    10 Shmuel 2018 07:01  -  10 Shmuel 2018 16:18  --------------------------------------------------------  IN: 800 mL / OUT: 300 mL / NET: 500 mL                            14.0   14.3  )-----------( 189      ( 10 Shmuel 2018 09:40 )             42.8       01-10    147<H>  |  106  |  36.0<H>  ----------------------------<  143<H>  3.0<L>   |  27.0  |  0.87    Ca    8.5<L>      10 Shmuel 2018 09:40  Mg     1.7     01-10              Culture:    TTE:    RADIOLOGY        MEDICATIONS  (STANDING):  atorvastatin 40 milliGRAM(s) Oral at bedtime  carvedilol 3.125 milliGRAM(s) Oral every 12 hours  cefTRIAXone   IVPB      cefTRIAXone   IVPB 1 Gram(s) IV Intermittent every 24 hours  finasteride 5 milliGRAM(s) Oral daily  fluticasone furoate/vilanterol 100-25 MICROgram(s) Inhaler 1 Puff(s) Inhalation daily  heparin  Injectable 5000 Unit(s) SubCutaneous every 8 hours  lisinopril 2.5 milliGRAM(s) Oral daily  potassium chloride    Tablet ER 40 milliEquivalent(s) Oral every 4 hours  saccharomyces boulardii 250 milliGRAM(s) Oral two times a day  sodium chloride 0.45%. 1000 milliLiter(s) (75 mL/Hr) IV Continuous <Continuous>  spironolactone 12.5 milliGRAM(s) Oral daily  tamsulosin 0.8 milliGRAM(s) Oral at bedtime      MEDICATIONS  (PRN):  acetaminophen   Tablet 650 milliGRAM(s) Oral every 6 hours PRN For Temp greater than 38 C (100.4 F)  ALBUTerol/ipratropium for Nebulization 3 milliLiter(s) Nebulizer every 6 hours PRN Shortness of Breath  artificial  tears Solution 2 Drop(s) Both EYES every 4 hours PRN Dry Eyes  benzocaine 15 mG/menthol 3.6 mG Lozenge 1 Lozenge Oral five times a day PRN Sore Throat  naphazoline/pheniramine Solution 1 Drop(s) Both EYES four times a day PRN burning HOSPITALIST PROGRESS NOTE    ANNY BELLA  [id number]  82yMale    Patient is a 82y old  Male who presents with a chief complaint of urinary retention (04 Jan 2018 16:51)      SUBJECTIVE:   Chart reviewed since last visit.  Patient seen and examined at bedside earlier today for urinary retention, UTI.  Continues to feel good.  Denies any fever, chills, abdominal pain, dyspnea, chest pain, dizziness or palpitations.  Concerned about going home with Berry as lives alone.      OBJECTIVE:  Vital Signs Last 24 Hrs  T(C): 37.2 (10 Shmuel 2018 15:11), Max: 37.2 (10 Shmuel 2018 15:11)  T(F): 99 (10 Shmuel 2018 15:11), Max: 99 (10 Shmuel 2018 15:11)  HR: 86 (10 Shmuel 2018 15:11) (86 - 99)  BP: 143/90 (10 Shmuel 2018 15:11) (140/91 - 153/87)  RR: 18 (10 Shmuel 2018 15:11) (18 - 18)  SpO2: 95% (10 Shmuel 2018 15:11) (95% - 99%)       PHYSICAL EXAMINATION  General: NAD[+]   nontoxic[+]   Elderly male sitting up in chair  HEENT: AT/NC[+]  PERRLA[+]  EOMI[+]  Arcus senilis[+]  NECK: Supple[+]  JVD[] Carotid bruit[]  CVS: RRR[+]  Irregular[]  S1+S2[+]   Murmur[]  RESP: Fair air entry bilaterally[+]   Clear sounds[]   poor effort []  wheeze[-]   Crackles[-]  GI: Soft[+]  Nondistended[+]   Nontender[+]   Bowel Sounds[+]  Mass[]   HSM[]   Ascites[]  : suprapubic tenderness[-]   CVA Tenderness[-]   Berry[+] Clear  MS: FROM[+]   Edema[-]  CNS: AAOx3[+]   grossly intact       I&O's Summary    09 Jan 2018 07:01  -  10 Shmuel 2018 07:00  --------------------------------------------------------  IN: 120 mL / OUT: 0 mL / NET: 120 mL    10 Shmuel 2018 07:01  -  10 Shmuel 2018 16:18  --------------------------------------------------------  IN: 800 mL / OUT: 300 mL / NET: 500 mL                            14.0   14.3  )-----------( 189      ( 10 Shmuel 2018 09:40 )             42.8       01-10    147<H>  |  106  |  36.0<H>  ----------------------------<  143<H>  3.0<L>   |  27.0  |  0.87    Ca    8.5<L>      10 Shmuel 2018 09:40  Mg     1.7     01-10             MEDICATIONS  (STANDING):  atorvastatin 40 milliGRAM(s) Oral at bedtime  carvedilol 3.125 milliGRAM(s) Oral every 12 hours  cefTRIAXone   IVPB      cefTRIAXone   IVPB 1 Gram(s) IV Intermittent every 24 hours  finasteride 5 milliGRAM(s) Oral daily  fluticasone furoate/vilanterol 100-25 MICROgram(s) Inhaler 1 Puff(s) Inhalation daily  heparin  Injectable 5000 Unit(s) SubCutaneous every 8 hours  lisinopril 2.5 milliGRAM(s) Oral daily  potassium chloride    Tablet ER 40 milliEquivalent(s) Oral every 4 hours  saccharomyces boulardii 250 milliGRAM(s) Oral two times a day  sodium chloride 0.45%. 1000 milliLiter(s) (75 mL/Hr) IV Continuous <Continuous>  spironolactone 12.5 milliGRAM(s) Oral daily  tamsulosin 0.8 milliGRAM(s) Oral at bedtime      MEDICATIONS  (PRN):  acetaminophen   Tablet 650 milliGRAM(s) Oral every 6 hours PRN For Temp greater than 38 C (100.4 F)  ALBUTerol/ipratropium for Nebulization 3 milliLiter(s) Nebulizer every 6 hours PRN Shortness of Breath  artificial  tears Solution 2 Drop(s) Both EYES every 4 hours PRN Dry Eyes  benzocaine 15 mG/menthol 3.6 mG Lozenge 1 Lozenge Oral five times a day PRN Sore Throat  naphazoline/pheniramine Solution 1 Drop(s) Both EYES four times a day PRN burning

## 2018-01-10 NOTE — PROGRESS NOTE ADULT - PROBLEM SELECTOR PLAN 3
Started on low dose ACEI, Aldactone and Lasix.  Will decrease Lasix to 20mg PO daily  Monitor fluid status.  Cardiology follow up Started on low dose ACEI, Aldactone and Lasix.  Discontinues Lasix as appears dry clinically  Monitor fluid status.  Cardiology follow up  Ischemic evaluation - patient wants to go to [facility] to primary cardiologist

## 2018-01-10 NOTE — DIETITIAN INITIAL EVALUATION ADULT. - OTHER INFO
pt recently lost his wife. He states he lost around 20 lbs in six months preceding her passing due to stress and taking care of her. He states his appetite is returning and he has been maintaining his weight.

## 2018-01-10 NOTE — DISCHARGE NOTE ADULT - MEDICATION SUMMARY - MEDICATIONS TO STOP TAKING
I will STOP taking the medications listed below when I get home from the hospital:    ciprofloxacin 250 mg oral tablet  -- 1 tab(s) by mouth every 12 hours   -- Avoid prolonged or excessive exposure to direct and/or artificial sunlight while taking this medication.  Check with your doctor before becoming pregnant.  Do not take dairy products, antacids, or iron preparations within one hour of this medication.  Finish all this medication unless otherwise directed by prescriber.  Medication should be taken with plenty of water.

## 2018-01-10 NOTE — PROGRESS NOTE ADULT - ASSESSMENT
82yoM from home with PMH HTN, AFib on Xarelto, and additional unknown hx presenting with urinary retention x2wks. P s per pt, has been experiencing 2wk hx of urinary retention during the day complicated by 4-5mo hx of nocturia, 6-7x each evening. He has been regularly following Urology Dr. Mark Schumer for the past 5-6 mo and was worked up for retention. Berry catheter was placed at that time, by his report his prostate was reportedly normal during the prior examinations in the office. Since removing the catheter 2wks ago symptoms have occurred as stated previously. Also during that time period he admits to onset of dysuria, difficulty initiating and maintaining stream as well. Also reports 1x episode of rigors in the home during the past week lasting 5min and resolving on its own   PT URINE CX ENTEROBACTER ON IV ABX  WBC  PENDING    PT WITH POSSIBEL CATH AS PER PT WANTSTO GO TO [facility]   WILL CONTINUE IV  THROUGH CATH  CAN CHANGE TO ORAL POST CATH TO COMPLETE TOTAL 2 WEEKS

## 2018-01-10 NOTE — PROGRESS NOTE ADULT - PROBLEM SELECTOR PROBLEM 8
HARPREET (acute kidney injury)
HARPERET (acute kidney injury)
HARPREET (acute kidney injury)

## 2018-01-10 NOTE — PROGRESS NOTE ADULT - PROBLEM SELECTOR PROBLEM 2
Acute urinary retention
Urinary tract infection with hematuria, site unspecified
Acute respiratory failure with hypoxia
Urinary tract infection with hematuria, site unspecified
Urinary retention
Urinary tract infection with hematuria, site unspecified

## 2018-01-11 VITALS
DIASTOLIC BLOOD PRESSURE: 81 MMHG | OXYGEN SATURATION: 95 % | TEMPERATURE: 98 F | HEART RATE: 83 BPM | RESPIRATION RATE: 20 BRPM | SYSTOLIC BLOOD PRESSURE: 139 MMHG

## 2018-01-11 PROCEDURE — 85018 HEMOGLOBIN: CPT

## 2018-01-11 PROCEDURE — 85027 COMPLETE CBC AUTOMATED: CPT

## 2018-01-11 PROCEDURE — 93308 TTE F-UP OR LMTD: CPT

## 2018-01-11 PROCEDURE — 87086 URINE CULTURE/COLONY COUNT: CPT

## 2018-01-11 PROCEDURE — 74018 RADEX ABDOMEN 1 VIEW: CPT

## 2018-01-11 PROCEDURE — 97116 GAIT TRAINING THERAPY: CPT

## 2018-01-11 PROCEDURE — 82962 GLUCOSE BLOOD TEST: CPT

## 2018-01-11 PROCEDURE — 97530 THERAPEUTIC ACTIVITIES: CPT

## 2018-01-11 PROCEDURE — 96374 THER/PROPH/DIAG INJ IV PUSH: CPT | Mod: XU

## 2018-01-11 PROCEDURE — 71045 X-RAY EXAM CHEST 1 VIEW: CPT

## 2018-01-11 PROCEDURE — 94002 VENT MGMT INPAT INIT DAY: CPT

## 2018-01-11 PROCEDURE — 87040 BLOOD CULTURE FOR BACTERIA: CPT

## 2018-01-11 PROCEDURE — 82435 ASSAY OF BLOOD CHLORIDE: CPT

## 2018-01-11 PROCEDURE — 83605 ASSAY OF LACTIC ACID: CPT

## 2018-01-11 PROCEDURE — 81001 URINALYSIS AUTO W/SCOPE: CPT

## 2018-01-11 PROCEDURE — 80053 COMPREHEN METABOLIC PANEL: CPT

## 2018-01-11 PROCEDURE — 85014 HEMATOCRIT: CPT

## 2018-01-11 PROCEDURE — 82947 ASSAY GLUCOSE BLOOD QUANT: CPT

## 2018-01-11 PROCEDURE — 51702 INSERT TEMP BLADDER CATH: CPT

## 2018-01-11 PROCEDURE — 36415 COLL VENOUS BLD VENIPUNCTURE: CPT

## 2018-01-11 PROCEDURE — 82803 BLOOD GASES ANY COMBINATION: CPT

## 2018-01-11 PROCEDURE — 99291 CRITICAL CARE FIRST HOUR: CPT | Mod: 25

## 2018-01-11 PROCEDURE — 83735 ASSAY OF MAGNESIUM: CPT

## 2018-01-11 PROCEDURE — 97110 THERAPEUTIC EXERCISES: CPT

## 2018-01-11 PROCEDURE — 93005 ELECTROCARDIOGRAM TRACING: CPT

## 2018-01-11 PROCEDURE — 84145 PROCALCITONIN (PCT): CPT

## 2018-01-11 PROCEDURE — 97163 PT EVAL HIGH COMPLEX 45 MIN: CPT

## 2018-01-11 PROCEDURE — 87186 SC STD MICRODIL/AGAR DIL: CPT

## 2018-01-11 PROCEDURE — 84100 ASSAY OF PHOSPHORUS: CPT

## 2018-01-11 PROCEDURE — 85610 PROTHROMBIN TIME: CPT

## 2018-01-11 PROCEDURE — 80048 BASIC METABOLIC PNL TOTAL CA: CPT

## 2018-01-11 PROCEDURE — 84132 ASSAY OF SERUM POTASSIUM: CPT

## 2018-01-11 PROCEDURE — 84295 ASSAY OF SERUM SODIUM: CPT

## 2018-01-11 PROCEDURE — 96372 THER/PROPH/DIAG INJ SC/IM: CPT | Mod: XU

## 2018-01-11 PROCEDURE — 93306 TTE W/DOPPLER COMPLETE: CPT

## 2018-01-11 PROCEDURE — 82330 ASSAY OF CALCIUM: CPT

## 2018-01-11 RX ADMIN — CARVEDILOL PHOSPHATE 3.12 MILLIGRAM(S): 80 CAPSULE, EXTENDED RELEASE ORAL at 06:25

## 2018-01-11 RX ADMIN — LISINOPRIL 2.5 MILLIGRAM(S): 2.5 TABLET ORAL at 06:25

## 2018-01-11 RX ADMIN — SPIRONOLACTONE 12.5 MILLIGRAM(S): 25 TABLET, FILM COATED ORAL at 06:25

## 2018-01-11 RX ADMIN — HEPARIN SODIUM 5000 UNIT(S): 5000 INJECTION INTRAVENOUS; SUBCUTANEOUS at 06:25

## 2018-01-11 RX ADMIN — SODIUM CHLORIDE 75 MILLILITER(S): 9 INJECTION, SOLUTION INTRAVENOUS at 06:25

## 2018-01-11 RX ADMIN — Medication 250 MILLIGRAM(S): at 06:25

## 2018-01-11 NOTE — PROGRESS NOTE ADULT - SUBJECTIVE AND OBJECTIVE BOX
[facility] - [facility]                                   [address] [place] [address]                                                      PHONE: [phone]                                                         FAX: [phone]  [email or web address]  ---------------------------------------------------------------------------------------------------------------------------------    Overnight events/patient complaints:  no events     PAST MEDICAL & SURGICAL HISTORY:  HTN (hypertension)  Afib  No significant past surgical history: could not assess, pt altered      No Known Allergies    MEDICATIONS  (STANDING):  atorvastatin 40 milliGRAM(s) Oral at bedtime  carvedilol 3.125 milliGRAM(s) Oral every 12 hours  cefTRIAXone   IVPB      cefTRIAXone   IVPB 1 Gram(s) IV Intermittent every 24 hours  finasteride 5 milliGRAM(s) Oral daily  fluticasone furoate/vilanterol 100-25 MICROgram(s) Inhaler 1 Puff(s) Inhalation daily  heparin  Injectable 5000 Unit(s) SubCutaneous every 8 hours  lisinopril 2.5 milliGRAM(s) Oral daily  saccharomyces boulardii 250 milliGRAM(s) Oral two times a day  sodium chloride 0.45%. 1000 milliLiter(s) (75 mL/Hr) IV Continuous <Continuous>  spironolactone 12.5 milliGRAM(s) Oral daily  tamsulosin 0.8 milliGRAM(s) Oral at bedtime    MEDICATIONS  (PRN):  acetaminophen   Tablet 650 milliGRAM(s) Oral every 6 hours PRN For Temp greater than 38 C (100.4 F)  ALBUTerol/ipratropium for Nebulization 3 milliLiter(s) Nebulizer every 6 hours PRN Shortness of Breath  artificial  tears Solution 2 Drop(s) Both EYES every 4 hours PRN Dry Eyes  benzocaine 15 mG/menthol 3.6 mG Lozenge 1 Lozenge Oral five times a day PRN Sore Throat  naphazoline/pheniramine Solution 1 Drop(s) Both EYES four times a day PRN burning      Vital Signs Last 24 Hrs  T(C): 36.9 (10 Shmuel 2018 23:42), Max: 37.2 (10 Shmuel 2018 15:11)  T(F): 98.5 (10 Shmuel 2018 23:42), Max: 99 (10 Shmuel 2018 15:11)  HR: 83 (11 Jan 2018 06:00) (83 - 88)  BP: 148/88 (11 Jan 2018 06:00) (143/90 - 148/88)  BP(mean): --  RR: 18 (10 Shmuel 2018 23:42) (18 - 18)  SpO2: 97% (10 Shmuel 2018 23:42) (95% - 97%)  ICU Vital Signs Last 24 Hrs  ANNY BELLA  I&O's Detail    10 Shmuel 2018 07:01  -  11 Jan 2018 07:00  --------------------------------------------------------  IN:    Oral Fluid: 500 mL    sodium chloride 0.45%.: 1475 mL    sodium chloride 0.9%: 300 mL    Solution: 100 mL  Total IN: 2375 mL    OUT:    Indwelling Catheter - Urethral: 2500 mL  Total OUT: 2500 mL    Total NET: -125 mL        I&O's Summary    10 Shmuel 2018 07:01  -  11 Jan 2018 07:00  --------------------------------------------------------  IN: 2375 mL / OUT: 2500 mL / NET: -125 mL      Drug Dosing Weight  ANNY JENA      PHYSICAL EXAM:  General: Appears well developed, well nourished alert and cooperative.  HEENT: Head; normocephalic, atraumatic.  Eyes: Pupils reactive, cornea wnl.  Neck: Supple, no nodes adenopathy, no NVD or carotid bruit or thyromegaly.  CARDIOVASCULAR: Normal S1 and S2, No murmur, rub, gallop or lift.   LUNGS: No rales, rhonchi or wheeze. Normal breath sounds bilaterally.  ABDOMEN: Soft, nontender without mass or organomegaly. bowel sounds normoactive.  EXTREMITIES: No clubbing, cyanosis or edema. Distal pulses wnl.   SKIN: warm and dry with normal turgor.  NEURO: Alert/oriented x 3/normal motor exam. No pathologic reflexes.    PSYCH: normal affect.        LABS:                        14.0   14.3  )-----------( 189      ( 10 Shmuel 2018 09:40 )             42.8     01-10    147<H>  |  106  |  36.0<H>  ----------------------------<  143<H>  3.0<L>   |  27.0  |  0.87    Ca    8.5<L>      10 Shmuel 2018 09:40  Mg     1.7     01-10      ANNY BELLA            RADIOLOGY & ADDITIONAL STUDIES:    INTERPRETATION OF TELEMETRY (personally reviewed):      ASSESSMENT AND PLAN:  In summary, ANNY BELLA is an 82y Male PMHX HTN, HLD, Afib on Xarelto, /sp unsuccessful ablation at [facility], who presented to [facility] ED complaining of abd pain.  Pt was admitted with urinary retention, encephalopathy, and urosepsis.  He required ICU care for pressors and mechanical ventilation.  Pt underwent surgical placement of a malcolm catheter.  Pt had an echo performed that demonstrated an Ef 30-35%, with severe MR.  Pt with no prior Hx of CHF.  Pt denies cp, palps, but does mention mild sob.     New onset HFrEF:   -etiology - ? secondary to septic shock vs takotsubos from spouse passing away 3 weeks ago, vs cad, vs afib  - ischemic work up -Cardiac cath   - pt to be transfered to [facility]   - atrovastatin 40 mg  - lasix 20 mg po daily - on hold as per appears dry - will not leave as a standing medication going forward  - spironolactone 12.5 mg   -  lisinopril 2.5 mg daily  - coreg  3.125 mg bid    Thank you for allowing [facility] to participate in the care of this patient.  Please feel free to call with any questions or concerns.

## 2018-01-11 NOTE — PROGRESS NOTE ADULT - PROVIDER SPECIALTY LIST ADULT
Cardiology
Critical Care
Hospitalist
Hospitalist
Infectious Disease
Urology
Critical Care
Hospitalist

## 2018-03-08 ENCOUNTER — APPOINTMENT (OUTPATIENT)
Dept: DERMATOLOGY | Facility: CLINIC | Age: 83
End: 2018-03-08

## 2018-11-27 PROBLEM — I10 ESSENTIAL (PRIMARY) HYPERTENSION: Chronic | Status: ACTIVE | Noted: 2018-01-04

## 2018-11-27 PROBLEM — I48.91 UNSPECIFIED ATRIAL FIBRILLATION: Chronic | Status: ACTIVE | Noted: 2018-01-04

## 2018-12-02 ENCOUNTER — RESULT REVIEW (OUTPATIENT)
Age: 83
End: 2018-12-02

## 2018-12-03 ENCOUNTER — APPOINTMENT (OUTPATIENT)
Dept: DERMATOLOGY | Facility: CLINIC | Age: 83
End: 2018-12-03
Payer: MEDICARE

## 2018-12-03 PROCEDURE — 99214 OFFICE O/P EST MOD 30 MIN: CPT | Mod: 25

## 2018-12-03 PROCEDURE — 11100 BX SKIN SUBCUTANEOUS&/MUCOUS MEMBRANE 1 LESION: CPT

## 2018-12-11 ENCOUNTER — APPOINTMENT (OUTPATIENT)
Dept: DERMATOLOGY | Facility: CLINIC | Age: 83
End: 2018-12-11
Payer: MEDICARE

## 2018-12-11 PROCEDURE — 99213 OFFICE O/P EST LOW 20 MIN: CPT

## 2020-04-05 NOTE — DISCHARGE NOTE ADULT - VISION (WITH CORRECTIVE LENSES IF THE PATIENT USUALLY WEARS THEM):
05-Apr-2020 09:05 Normal vision: sees adequately in most situations; can see medication labels, newsprint

## 2021-05-20 NOTE — PATIENT PROFILE ADULT. - SPIRITUAL CULTURAL, RELIGIOUS PRACTICES/VALUES, PROFILE
Initiate Treatment: Apply eye gel mask three times a day for 30 minutes\\nContinue Cicalfate cream twice daily and sun protection\\n
Detail Level: Zone
none

## 2024-04-10 NOTE — ED ADULT NURSE NOTE - CHIEF COMPLAINT
[facility] APPOINTMENT CANCELLATION      Date: 6/18/2024    Time: 9:00 am    Provider name: Dr. Charli Palacios     Type: In-office visit    Is patient currently in a facility? No    Alternative contact information: [place]     Reason for cancellation: dont see the need to follow up per daughter Mariam     Appointment cancelled or rescheduled: cancelled  
The patient is a 82y Male complaining of urinary retention.
[place] Pregnancy And Lactation Text: There is insufficient data to evaluate drug-associated risk for major birth defects, miscarriage, or other adverse maternal or fetal outcomes.  There is a pregnancy registry that monitors pregnancy outcomes in pregnant persons exposed to the medication during pregnancy.  It is unknown if this medication is excreted in breast milk.  Do not breastfeed during treatment and for about 4 weeks after the last dose.